# Patient Record
Sex: FEMALE | Race: WHITE | NOT HISPANIC OR LATINO | Employment: FULL TIME | URBAN - METROPOLITAN AREA
[De-identification: names, ages, dates, MRNs, and addresses within clinical notes are randomized per-mention and may not be internally consistent; named-entity substitution may affect disease eponyms.]

---

## 2017-08-01 ENCOUNTER — TRANSCRIBE ORDERS (OUTPATIENT)
Dept: URGENT CARE | Facility: MEDICAL CENTER | Age: 37
End: 2017-08-01

## 2017-08-01 ENCOUNTER — APPOINTMENT (OUTPATIENT)
Dept: URGENT CARE | Facility: MEDICAL CENTER | Age: 37
End: 2017-08-01

## 2017-08-01 ENCOUNTER — APPOINTMENT (OUTPATIENT)
Dept: LAB | Facility: MEDICAL CENTER | Age: 37
End: 2017-08-01

## 2017-08-01 DIAGNOSIS — Z02.1 PRE-EMPLOYMENT HEALTH SCREENING EXAMINATION: ICD-10-CM

## 2017-08-01 DIAGNOSIS — Z02.1 PRE-EMPLOYMENT HEALTH SCREENING EXAMINATION: Primary | ICD-10-CM

## 2017-08-01 LAB — RUBV IGG SERPL IA-ACNC: 86.2 IU/ML

## 2017-08-01 PROCEDURE — 86762 RUBELLA ANTIBODY: CPT

## 2017-08-01 PROCEDURE — 86765 RUBEOLA ANTIBODY: CPT

## 2017-08-01 PROCEDURE — 86787 VARICELLA-ZOSTER ANTIBODY: CPT

## 2017-08-01 PROCEDURE — 86735 MUMPS ANTIBODY: CPT

## 2017-08-01 PROCEDURE — 86480 TB TEST CELL IMMUN MEASURE: CPT

## 2017-08-01 PROCEDURE — 36415 COLL VENOUS BLD VENIPUNCTURE: CPT

## 2017-08-03 LAB
ANNOTATION COMMENT IMP: NORMAL
GAMMA INTERFERON BACKGROUND BLD IA-ACNC: 0.04 IU/ML
M TB IFN-G BLD-IMP: NEGATIVE
M TB IFN-G CD4+ BCKGRND COR BLD-ACNC: 0.01 IU/ML
M TB IFN-G CD4+ T-CELLS BLD-ACNC: 0.05 IU/ML
MEV IGG SER QL: NORMAL
MITOGEN IGNF BLD-ACNC: >10 IU/ML
MUV IGG SER QL: NORMAL
QUANTIFERON-TB GOLD IN TUBE: NORMAL
SERVICE CMNT-IMP: NORMAL
VZV IGG SER IA-ACNC: NORMAL

## 2017-09-07 ENCOUNTER — APPOINTMENT (OUTPATIENT)
Dept: LAB | Facility: HOSPITAL | Age: 37
End: 2017-09-07
Payer: COMMERCIAL

## 2017-09-07 ENCOUNTER — TRANSCRIBE ORDERS (OUTPATIENT)
Dept: LAB | Facility: HOSPITAL | Age: 37
End: 2017-09-07

## 2017-09-07 DIAGNOSIS — Z31.41 FERTILITY TESTING: Primary | ICD-10-CM

## 2017-09-07 DIAGNOSIS — Z31.41 FERTILITY TESTING: ICD-10-CM

## 2017-09-07 LAB — PROGEST SERPL-MCNC: 22.1 NG/ML

## 2017-09-07 PROCEDURE — 84144 ASSAY OF PROGESTERONE: CPT

## 2017-09-07 PROCEDURE — 36415 COLL VENOUS BLD VENIPUNCTURE: CPT

## 2017-10-11 ENCOUNTER — TRANSCRIBE ORDERS (OUTPATIENT)
Dept: ADMINISTRATIVE | Facility: HOSPITAL | Age: 37
End: 2017-10-11

## 2017-10-11 DIAGNOSIS — E07.89 OTHER SPECIFIED DISORDERS OF THYROID: Primary | ICD-10-CM

## 2017-10-18 ENCOUNTER — HOSPITAL ENCOUNTER (OUTPATIENT)
Dept: RADIOLOGY | Facility: HOSPITAL | Age: 37
Discharge: HOME | End: 2017-10-18
Payer: COMMERCIAL

## 2017-10-18 DIAGNOSIS — E07.89 OTHER SPECIFIED DISORDERS OF THYROID: ICD-10-CM

## 2017-10-18 PROCEDURE — 76536 US EXAM OF HEAD AND NECK: CPT

## 2017-11-29 ENCOUNTER — APPOINTMENT (OUTPATIENT)
Dept: LAB | Facility: HOSPITAL | Age: 37
End: 2017-11-29
Payer: COMMERCIAL

## 2017-11-29 ENCOUNTER — TRANSCRIBE ORDERS (OUTPATIENT)
Dept: LAB | Facility: HOSPITAL | Age: 37
End: 2017-11-29

## 2017-11-29 DIAGNOSIS — N97.9 PRIMARY FEMALE INFERTILITY: Primary | ICD-10-CM

## 2017-11-29 DIAGNOSIS — N97.9 PRIMARY FEMALE INFERTILITY: ICD-10-CM

## 2017-11-29 LAB — PROGEST SERPL-MCNC: 16.4 NG/ML

## 2017-11-29 PROCEDURE — 84144 ASSAY OF PROGESTERONE: CPT

## 2017-11-29 PROCEDURE — 36415 COLL VENOUS BLD VENIPUNCTURE: CPT

## 2017-12-09 ENCOUNTER — OFFICE VISIT (OUTPATIENT)
Dept: URGENT CARE | Facility: MEDICAL CENTER | Age: 37
End: 2017-12-09
Payer: COMMERCIAL

## 2017-12-09 PROCEDURE — 99202 OFFICE O/P NEW SF 15 MIN: CPT

## 2017-12-15 NOTE — PROGRESS NOTES
Assessment  1  External otitis of right ear (380 10) (H60 91)    Plan  External otitis of right ear    · Ciprodex 0 3-0 1 % Otic Suspension; INSTILL 3 DROPS IN AFFECTED EAR(S)TWICE DAILY  Unlinked    · PEG 3350/Electrolytes 240 GM Oral Solution Reconstituted    Discussion/Summary  Discussion Summary:   Follow-up if symptoms increase or no better in 5 days  Medication Side Effects Reviewed: Possible side effects of new medications were reviewed with the patient/guardian today  Understands and agrees with treatment plan: The treatment plan was reviewed with the patient/guardian  The patient/guardian understands and agrees with the treatment plan   Counseling Documentation With Imm: The patient was counseled regarding instructions for management,-- prognosis,-- patient and family education,-- impressions  Chief Complaint  1  Ear Pain  Chief Complaint Free Text Note Form: Pt with complaints of right outer pain for 3-4 days  History of Present Illness  HPI: Patient with right ear pain over the last 3-4 days  She did scratch in the ear  No drainage, fever or chills  Hospital Based Practices Required Assessment:  Pain Assessment  the patient states they have pain  The pain is located in the right outer ear  The patient describes the pain as aching  (on a scale of 0 to 10, the patient rates the pain at 4 )  Abuse And Domestic Violence Screen   Yes, the patient is safe at home  -- The patient states no one is hurting them  Depression And Suicide Screen  No, the patient has not had thoughts of hurting themself  No, the patient has not felt depressed in the past 7 days  Prefered Language is  english  Primary Language is  english  Review of Systems  ROS Reviewed:   ROS reviewed  Social History   · Denies alcohol consumption (V49 89) (Z78 9)   · Never a smoker    Current Meds   1  Dexilant 30 MG Oral Capsule Delayed Release; Therapy: (Recorded:[id number]) to Recorded   2   PEG 3350/Electrolytes 240 GM Oral Solution Reconstituted; Therapy: 18Apr2011 to (Last Rx:18Apr2011)  Requested for: [id number] Ordered    Allergies  1  Neomycin Sulfate TABS   2  Tramadol    Vitals  Signs   Recorded: [id number] 04:29PM   Temperature: 98 1 F  Heart Rate: 76  Respiration: 20  Systolic: 955  Diastolic: 70  O2 Saturation: 100  Pain Scale: 4    Physical Exam   Constitutional  General appearance: No acute distress, well appearing and well nourished  Ears, Nose, Mouth, and Throat  Otoscopic examination: Abnormal   The right external canal was tender,-- was swollen-- and-- was erythematous, but-- did not have a cerumen impaction,-- did not have desquamation of the epithelium,-- did not have a discharge,-- did not have a foreign body,-- did not have exostosis-- and-- did not have an acquired stenosis        Signatures   Electronically signed by : Elisa Spring, [facility]; Dec  9 2017  4:42PM EST                       (Author)    Electronically signed by : JOSE Hutchins ; Dec 14 2017 12:10PM EST                       (Co-author)

## 2018-01-23 VITALS
RESPIRATION RATE: 20 BRPM | SYSTOLIC BLOOD PRESSURE: 112 MMHG | HEART RATE: 76 BPM | DIASTOLIC BLOOD PRESSURE: 70 MMHG | TEMPERATURE: 98.1 F | OXYGEN SATURATION: 100 %

## 2018-03-20 ENCOUNTER — APPOINTMENT (OUTPATIENT)
Dept: LAB | Facility: HOSPITAL | Age: 38
End: 2018-03-20
Payer: COMMERCIAL

## 2018-03-20 ENCOUNTER — TRANSCRIBE ORDERS (OUTPATIENT)
Dept: ADMINISTRATIVE | Facility: HOSPITAL | Age: 38
End: 2018-03-20

## 2018-03-20 DIAGNOSIS — N91.2 ABSENCE OF MENSTRUATION: Primary | ICD-10-CM

## 2018-03-20 DIAGNOSIS — N91.2 ABSENCE OF MENSTRUATION: ICD-10-CM

## 2018-03-20 LAB
B-HCG SERPL-ACNC: <2 MIU/ML
ESTRADIOL SERPL-MCNC: 69 PG/ML
PROGEST SERPL-MCNC: 1.8 NG/ML

## 2018-03-20 PROCEDURE — 84144 ASSAY OF PROGESTERONE: CPT

## 2018-03-20 PROCEDURE — 82670 ASSAY OF TOTAL ESTRADIOL: CPT

## 2018-03-20 PROCEDURE — 84702 CHORIONIC GONADOTROPIN TEST: CPT

## 2018-03-20 PROCEDURE — 36415 COLL VENOUS BLD VENIPUNCTURE: CPT

## 2018-07-25 ENCOUNTER — TRANSCRIBE ORDERS (OUTPATIENT)
Dept: LAB | Facility: HOSPITAL | Age: 38
End: 2018-07-25

## 2018-07-25 ENCOUNTER — APPOINTMENT (OUTPATIENT)
Dept: LAB | Facility: HOSPITAL | Age: 38
End: 2018-07-25
Payer: COMMERCIAL

## 2018-07-25 DIAGNOSIS — R76.0 RAISED ANTIBODY TITER: ICD-10-CM

## 2018-07-25 DIAGNOSIS — D64.9 ANEMIA, UNSPECIFIED TYPE: ICD-10-CM

## 2018-07-25 DIAGNOSIS — Z01.83 ENCOUNTER FOR BLOOD TYPING: ICD-10-CM

## 2018-07-25 DIAGNOSIS — Z22.4 GONORRHEA CARRIER: ICD-10-CM

## 2018-07-25 DIAGNOSIS — E55.9 AVITAMINOSIS D: ICD-10-CM

## 2018-07-25 DIAGNOSIS — Z13.6 SCREENING FOR ISCHEMIC HEART DISEASE: ICD-10-CM

## 2018-07-25 DIAGNOSIS — Z11.8 SCREENING EXAMINATION FOR TRACHOMA: ICD-10-CM

## 2018-07-25 DIAGNOSIS — E66.8 OBESITY OF ENDOCRINE ORIGIN: ICD-10-CM

## 2018-07-25 DIAGNOSIS — E07.9 DISEASE OF THYROID GLAND: ICD-10-CM

## 2018-07-25 DIAGNOSIS — Z11.3 SCREENING EXAMINATION FOR VENEREAL DISEASE: ICD-10-CM

## 2018-07-25 DIAGNOSIS — R05.9 COUGH: ICD-10-CM

## 2018-07-25 DIAGNOSIS — Z00.8 HEALTH EXAMINATION IN POPULATION SURVEY: ICD-10-CM

## 2018-07-25 DIAGNOSIS — Z31.41 FERTILITY TESTING: ICD-10-CM

## 2018-07-25 DIAGNOSIS — Z00.8 HEALTH EXAMINATION IN POPULATION SURVEY: Primary | ICD-10-CM

## 2018-07-25 LAB
ALBUMIN SERPL BCP-MCNC: 3.7 G/DL (ref 3.5–5)
ALP SERPL-CCNC: 65 U/L (ref 46–116)
ALT SERPL W P-5'-P-CCNC: 21 U/L (ref 12–78)
AST SERPL W P-5'-P-CCNC: 12 U/L (ref 5–45)
BASOPHILS # BLD AUTO: 0.02 THOUSANDS/ΜL (ref 0–0.1)
BASOPHILS NFR BLD AUTO: 0 % (ref 0–1)
BILIRUB DIRECT SERPL-MCNC: 0.11 MG/DL (ref 0–0.2)
BILIRUB SERPL-MCNC: 0.47 MG/DL (ref 0.2–1)
CHOLEST SERPL-MCNC: 172 MG/DL (ref 50–200)
EOSINOPHIL # BLD AUTO: 0.44 THOUSAND/ΜL (ref 0–0.61)
EOSINOPHIL NFR BLD AUTO: 5 % (ref 0–6)
ERYTHROCYTE [DISTWIDTH] IN BLOOD BY AUTOMATED COUNT: 12.9 % (ref 11.6–15.1)
EST. AVERAGE GLUCOSE BLD GHB EST-MCNC: 105 MG/DL
HBA1C MFR BLD: 5.3 % (ref 4.2–6.3)
HCT VFR BLD AUTO: 37.5 % (ref 34.8–46.1)
HDLC SERPL-MCNC: 47 MG/DL (ref 40–60)
HGB BLD-MCNC: 12.1 G/DL (ref 11.5–15.4)
IMM GRANULOCYTES # BLD AUTO: 0.03 THOUSAND/UL (ref 0–0.2)
IMM GRANULOCYTES NFR BLD AUTO: 0 % (ref 0–2)
LDLC SERPL CALC-MCNC: 101 MG/DL (ref 0–100)
LYMPHOCYTES # BLD AUTO: 2.33 THOUSANDS/ΜL (ref 0.6–4.47)
LYMPHOCYTES NFR BLD AUTO: 28 % (ref 14–44)
MCH RBC QN AUTO: 28.9 PG (ref 26.8–34.3)
MCHC RBC AUTO-ENTMCNC: 32.3 G/DL (ref 31.4–37.4)
MCV RBC AUTO: 90 FL (ref 82–98)
MONOCYTES # BLD AUTO: 0.52 THOUSAND/ΜL (ref 0.17–1.22)
MONOCYTES NFR BLD AUTO: 6 % (ref 4–12)
NEUTROPHILS # BLD AUTO: 5.07 THOUSANDS/ΜL (ref 1.85–7.62)
NEUTS SEG NFR BLD AUTO: 61 % (ref 43–75)
NONHDLC SERPL-MCNC: 125 MG/DL
NRBC BLD AUTO-RTO: 0 /100 WBCS
PLATELET # BLD AUTO: 252 THOUSANDS/UL (ref 149–390)
PMV BLD AUTO: 10.4 FL (ref 8.9–12.7)
PROT SERPL-MCNC: 7.2 G/DL (ref 6.4–8.2)
RBC # BLD AUTO: 4.19 MILLION/UL (ref 3.81–5.12)
TRIGL SERPL-MCNC: 119 MG/DL
WBC # BLD AUTO: 8.41 THOUSAND/UL (ref 4.31–10.16)

## 2018-07-25 PROCEDURE — 87591 N.GONORRHOEAE DNA AMP PROB: CPT

## 2018-07-25 PROCEDURE — 80076 HEPATIC FUNCTION PANEL: CPT

## 2018-07-25 PROCEDURE — 36415 COLL VENOUS BLD VENIPUNCTURE: CPT

## 2018-07-25 PROCEDURE — 87491 CHLMYD TRACH DNA AMP PROBE: CPT

## 2018-07-25 PROCEDURE — 80061 LIPID PANEL: CPT

## 2018-07-25 PROCEDURE — 83036 HEMOGLOBIN GLYCOSYLATED A1C: CPT

## 2018-07-25 PROCEDURE — 85025 COMPLETE CBC W/AUTO DIFF WBC: CPT

## 2018-07-26 LAB
CHLAMYDIA DNA CVX QL NAA+PROBE: NORMAL
N GONORRHOEA DNA GENITAL QL NAA+PROBE: NORMAL

## 2018-09-04 ENCOUNTER — ANESTHESIA EVENT (OUTPATIENT)
Dept: PERIOP | Facility: AMBULARY SURGERY CENTER | Age: 38
End: 2018-09-04
Payer: COMMERCIAL

## 2018-09-04 RX ORDER — ALPRAZOLAM 0.5 MG/1
TABLET ORAL AS NEEDED
COMMUNITY
End: 2020-12-05 | Stop reason: ALTCHOICE

## 2018-09-04 RX ORDER — NORETHINDRONE ACETATE AND ETHINYL ESTRADIOL 1; 5 MG/1; UG/1
1 TABLET ORAL
COMMUNITY
End: 2020-12-05 | Stop reason: ALTCHOICE

## 2018-09-04 RX ORDER — ONDANSETRON 4 MG/1
4 TABLET, FILM COATED ORAL EVERY 8 HOURS PRN
COMMUNITY
End: 2021-06-16 | Stop reason: SDUPTHER

## 2018-09-04 RX ORDER — DEXLANSOPRAZOLE 60 MG/1
60 CAPSULE, DELAYED RELEASE ORAL
COMMUNITY
End: 2020-12-05 | Stop reason: ALTCHOICE

## 2018-09-04 RX ORDER — ZOLMITRIPTAN 2.5 MG/1
2.5 TABLET, FILM COATED ORAL ONCE AS NEEDED
COMMUNITY
End: 2020-12-05 | Stop reason: ALTCHOICE

## 2018-09-04 NOTE — PRE-PROCEDURE INSTRUCTIONS
Pre-Surgery Instructions:   Medication Instructions    ALPRAZolam (XANAX) 0 5 mg tablet Instructed patient per Anesthesia Guidelines   Coenzyme Q10 (COQ10 PO) Instructed patient per Anesthesia Guidelines   dexlansoprazole (DEXILANT) 60 MG capsule Instructed patient per Anesthesia Guidelines   norethindrone-ethinyl estradiol (FEMHRT 1/5) 1-5 MG-MCG TABS Patient was instructed by Physician and understands   ondansetron (ZOFRAN) 4 mg tablet Instructed patient per Anesthesia Guidelines   Prenatal Vit-Fe Fumarate-FA (PRENATAL FORMULA PO) Instructed patient per Anesthesia Guidelines   ZOLMitriptan (ZOMIG) 2 5 MG tablet Instructed patient per Anesthesia Guidelines      Pre op and showering instructions using an antibacterial soap reviewed

## 2018-09-10 ENCOUNTER — HOSPITAL ENCOUNTER (OUTPATIENT)
Facility: AMBULARY SURGERY CENTER | Age: 38
Setting detail: OUTPATIENT SURGERY
Discharge: HOME | End: 2018-09-10
Attending: OBSTETRICS & GYNECOLOGY | Admitting: OBSTETRICS & GYNECOLOGY
Payer: COMMERCIAL

## 2018-09-10 ENCOUNTER — ANESTHESIA (OUTPATIENT)
Dept: PERIOP | Facility: AMBULARY SURGERY CENTER | Age: 38
End: 2018-09-10
Payer: COMMERCIAL

## 2018-09-10 VITALS
TEMPERATURE: 97.5 F | SYSTOLIC BLOOD PRESSURE: 124 MMHG | RESPIRATION RATE: 20 BRPM | BODY MASS INDEX: 27.43 KG/M2 | OXYGEN SATURATION: 99 % | DIASTOLIC BLOOD PRESSURE: 59 MMHG | HEIGHT: 68 IN | WEIGHT: 181 LBS | HEART RATE: 71 BPM

## 2018-09-10 DIAGNOSIS — Z98.890 STATUS POST HYSTEROSCOPY: Primary | ICD-10-CM

## 2018-09-10 DIAGNOSIS — N85.6 INTRAUTERINE SYNECHIAE: ICD-10-CM

## 2018-09-10 LAB — EXT PREGNANCY TEST URINE: NEGATIVE

## 2018-09-10 PROCEDURE — 88305 TISSUE EXAM BY PATHOLOGIST: CPT | Performed by: PATHOLOGY

## 2018-09-10 PROCEDURE — 81025 URINE PREGNANCY TEST: CPT | Performed by: OBSTETRICS & GYNECOLOGY

## 2018-09-10 RX ORDER — PROPOFOL 10 MG/ML
INJECTION, EMULSION INTRAVENOUS CONTINUOUS PRN
Status: DISCONTINUED | OUTPATIENT
Start: 2018-09-10 | End: 2018-09-10 | Stop reason: SURG

## 2018-09-10 RX ORDER — FENTANYL CITRATE/PF 50 MCG/ML
50 SYRINGE (ML) INJECTION
Status: DISCONTINUED | OUTPATIENT
Start: 2018-09-10 | End: 2018-09-10 | Stop reason: HOSPADM

## 2018-09-10 RX ORDER — SODIUM CHLORIDE 9 MG/ML
INJECTION, SOLUTION INTRAVENOUS CONTINUOUS PRN
Status: DISCONTINUED | OUTPATIENT
Start: 2018-09-10 | End: 2018-09-10 | Stop reason: SURG

## 2018-09-10 RX ORDER — ONDANSETRON 2 MG/ML
4 INJECTION INTRAMUSCULAR; INTRAVENOUS ONCE AS NEEDED
Status: DISCONTINUED | OUTPATIENT
Start: 2018-09-10 | End: 2018-09-10 | Stop reason: HOSPADM

## 2018-09-10 RX ORDER — LIDOCAINE HYDROCHLORIDE 10 MG/ML
INJECTION, SOLUTION INFILTRATION; PERINEURAL AS NEEDED
Status: DISCONTINUED | OUTPATIENT
Start: 2018-09-10 | End: 2018-09-10

## 2018-09-10 RX ORDER — ACETAMINOPHEN 325 MG/1
650 TABLET ORAL EVERY 6 HOURS PRN
Qty: 30 TABLET | Refills: 0 | Status: CANCELLED
Start: 2018-09-10

## 2018-09-10 RX ORDER — IBUPROFEN 600 MG/1
600 TABLET ORAL EVERY 6 HOURS PRN
Status: DISCONTINUED | OUTPATIENT
Start: 2018-09-10 | End: 2018-09-10 | Stop reason: HOSPADM

## 2018-09-10 RX ORDER — LIDOCAINE HYDROCHLORIDE 10 MG/ML
INJECTION, SOLUTION INFILTRATION; PERINEURAL AS NEEDED
Status: DISCONTINUED | OUTPATIENT
Start: 2018-09-10 | End: 2018-09-10 | Stop reason: SURG

## 2018-09-10 RX ORDER — FENTANYL CITRATE 50 UG/ML
INJECTION, SOLUTION INTRAMUSCULAR; INTRAVENOUS AS NEEDED
Status: DISCONTINUED | OUTPATIENT
Start: 2018-09-10 | End: 2018-09-10 | Stop reason: SURG

## 2018-09-10 RX ORDER — METOCLOPRAMIDE HYDROCHLORIDE 5 MG/ML
10 INJECTION INTRAMUSCULAR; INTRAVENOUS ONCE AS NEEDED
Status: DISCONTINUED | OUTPATIENT
Start: 2018-09-10 | End: 2018-09-10 | Stop reason: HOSPADM

## 2018-09-10 RX ORDER — PROPOFOL 10 MG/ML
INJECTION, EMULSION INTRAVENOUS AS NEEDED
Status: DISCONTINUED | OUTPATIENT
Start: 2018-09-10 | End: 2018-09-10 | Stop reason: SURG

## 2018-09-10 RX ORDER — ONDANSETRON 2 MG/ML
INJECTION INTRAMUSCULAR; INTRAVENOUS AS NEEDED
Status: DISCONTINUED | OUTPATIENT
Start: 2018-09-10 | End: 2018-09-10 | Stop reason: SURG

## 2018-09-10 RX ORDER — MIDAZOLAM HYDROCHLORIDE 1 MG/ML
INJECTION INTRAMUSCULAR; INTRAVENOUS AS NEEDED
Status: DISCONTINUED | OUTPATIENT
Start: 2018-09-10 | End: 2018-09-10 | Stop reason: SURG

## 2018-09-10 RX ORDER — ACETAMINOPHEN 325 MG/1
650 TABLET ORAL EVERY 6 HOURS PRN
Status: DISCONTINUED | OUTPATIENT
Start: 2018-09-10 | End: 2018-09-10 | Stop reason: HOSPADM

## 2018-09-10 RX ORDER — MAGNESIUM HYDROXIDE 1200 MG/15ML
LIQUID ORAL AS NEEDED
Status: DISCONTINUED | OUTPATIENT
Start: 2018-09-10 | End: 2018-09-10 | Stop reason: HOSPADM

## 2018-09-10 RX ORDER — IBUPROFEN 600 MG/1
600 TABLET ORAL EVERY 6 HOURS PRN
Qty: 30 TABLET | Refills: 0 | Status: CANCELLED
Start: 2018-09-10

## 2018-09-10 RX ORDER — DIPHENOXYLATE HYDROCHLORIDE AND ATROPINE SULFATE 2.5; .025 MG/1; MG/1
1 TABLET ORAL DAILY
COMMUNITY

## 2018-09-10 RX ADMIN — FENTANYL CITRATE 50 MCG: 50 INJECTION INTRAMUSCULAR; INTRAVENOUS at 09:03

## 2018-09-10 RX ADMIN — FENTANYL CITRATE 50 MCG: 50 INJECTION INTRAMUSCULAR; INTRAVENOUS at 09:09

## 2018-09-10 RX ADMIN — ONDANSETRON 4 MG: 2 INJECTION INTRAMUSCULAR; INTRAVENOUS at 08:15

## 2018-09-10 RX ADMIN — FENTANYL CITRATE 50 MCG: 50 INJECTION, SOLUTION INTRAMUSCULAR; INTRAVENOUS at 08:37

## 2018-09-10 RX ADMIN — MIDAZOLAM HYDROCHLORIDE 2 MG: 1 INJECTION, SOLUTION INTRAMUSCULAR; INTRAVENOUS at 08:00

## 2018-09-10 RX ADMIN — DEXAMETHASONE SODIUM PHOSPHATE 10 MG: 10 INJECTION INTRAMUSCULAR; INTRAVENOUS at 08:15

## 2018-09-10 RX ADMIN — PROPOFOL 200 MG: 10 INJECTION, EMULSION INTRAVENOUS at 08:03

## 2018-09-10 RX ADMIN — PROPOFOL 140 MCG/KG/MIN: 10 INJECTION, EMULSION INTRAVENOUS at 08:03

## 2018-09-10 RX ADMIN — FENTANYL CITRATE 50 MCG: 50 INJECTION, SOLUTION INTRAMUSCULAR; INTRAVENOUS at 08:21

## 2018-09-10 RX ADMIN — SODIUM CHLORIDE: 0.9 INJECTION, SOLUTION INTRAVENOUS at 07:08

## 2018-09-10 RX ADMIN — FENTANYL CITRATE 50 MCG: 50 INJECTION, SOLUTION INTRAMUSCULAR; INTRAVENOUS at 08:03

## 2018-09-10 RX ADMIN — FENTANYL CITRATE 50 MCG: 50 INJECTION, SOLUTION INTRAMUSCULAR; INTRAVENOUS at 08:17

## 2018-09-10 RX ADMIN — LIDOCAINE HYDROCHLORIDE 40 MG: 10 INJECTION, SOLUTION INFILTRATION; PERINEURAL at 08:03

## 2018-09-10 NOTE — ANESTHESIA PREPROCEDURE EVALUATION
Review of Systems/Medical History  Patient summary reviewed  Chart reviewed  History of anesthetic complications PONV    Cardiovascular   Pulmonary  Negative pulmonary ROS        GI/Hepatic    GERD ,             Endo/Other     GYN       Hematology   Musculoskeletal       Neurology    Headaches,    Psychology   Anxiety,              Physical Exam    Airway    Mallampati score: II  TM Distance: >3 FB  Neck ROM: full     Dental       Cardiovascular      Pulmonary      Other Findings        Anesthesia Plan  ASA Score- 2     Anesthesia Type- general with ASA Monitors  Additional Monitors:   Airway Plan: LMA  Plan Factors-    Induction- intravenous  Postoperative Plan-     Informed Consent- Anesthetic plan and risks discussed with patient  I personally reviewed this patient with the CRNA  Discussed and agreed on the Anesthesia Plan with the CRNA  Lawrence Koo

## 2018-09-10 NOTE — ANESTHESIA POSTPROCEDURE EVALUATION
Post-Op Assessment Note      CV Status:  Stable    Mental Status:  Somnolent and lethargic    Hydration Status:  Euvolemic    PONV Controlled:  Controlled    Airway Patency:  Patent    Post Op Vitals Reviewed: Yes          Staff: CRNA           BP   124/67   Temp      Pulse 74   Resp 16   SpO2 100

## 2018-09-10 NOTE — DISCHARGE INSTRUCTIONS
Hysteroscopy   WHAT YOU SHOULD KNOW:   A hysteroscopy is a procedure to look at the inside of your uterus  Other procedures may also be done during the hysteroscopy  AFTER YOU LEAVE:   Medicines:   · Acetaminophen  decreases pain  It is available without a doctor's order  Ask how much to take and how often to take it  Follow directions  Acetaminophen can cause liver damage if not taken correctly  · NSAIDs  help decrease swelling and pain  This medicine is available with or without a doctor's order  NSAIDs can cause stomach bleeding or kidney problems in certain people  If you take blood thinner medicine, always ask your primary healthcare provider (PHP) if NSAIDs are safe for you  Always read the medicine label and follow directions  · Take your medicine as directed  Contact your PHP if you think your medicine is not helping or if you have side effects  Tell him if you are allergic to any medicine  Keep a list of the medicines, vitamins, and herbs you take  Include the amounts, and when and why you take them  Bring the list or the pill bottles to follow-up visits  Carry your medicine list with you in case of an emergency  Follow up with your PHP or gynecologist as directed:  Write down your questions so you remember to ask them during your visits  Activity guidelines: You may feel like resting more after the procedure  Slowly start to do more each day  Rest when you feel it is needed  Ask your PHP when you can return to your daily activities  Self-care:   · Do not put anything into your vagina until your PHP says it is okay  Do not have sex, douche, or use tampons  · You may need to continue to wear a sanitary pad for up to a week  You may have light bleeding or spotting  Contact your PHP if:   · You have a fever  · You have to change your sanitary pad every hour  · You have chills, a cough, or feel weak and achy  · You have abdominal pain that does not go away      · You have abnormal or foul-smelling vaginal discharge  · Your skin is itchy, swollen, or you have a rash  · You have questions or concerns about your condition or care  © 2014 [address] is for End User's use only and may not be sold, redistributed or otherwise used for commercial purposes  All illustrations and images included in CareNotes® are the copyrighted property of A D A M , Inc  or Paul Croft  The above information is an  only  It is not intended as medical advice for individual conditions or treatments  Talk to your doctor, nurse or pharmacist before following any medical regimen to see if it is safe and effective for you

## 2018-09-10 NOTE — OP NOTE
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]    Procedure(s) (LRB):  HYSTEROSCOPY; LYSIS OF ADHESIONS; ENDOMETRIAL BIOPSY; MYOSURE (N/A)    Specimen(s):  ID Type Source Tests Collected by Time Destination   1 : endometrial currettings Tissue Endometrium TISSUE EXAM Ting Arechiga DO 9/10/2018 9488        Estimated Blood Loss:   Minimal    Drains: None  Urine: 50 ml  IVF: 250 ml  Hysteroscopic fluid deficit: 500 ml       Anesthesia Type:   Choice    Operative Indications:  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]      Operative Findings:  Uterus sounded to 7 centimeters anteverted  Through the hysteroscope both ostia were well visualized  There was some intrauterine scarring in the fundal region as well as the left cornual region and left sidewall  There was no other intracavitary lesions  Complications:   None    Procedure and Technique:  The patient was identified in the preoperative holding area and taken to the operative suite where she was put placed in supine position  She then underwent anesthesia  She was then placed in the dorsal lithotomy position and prepped and draped in the normal sterile fashion  A time-out was held according to protocol and was deemed we could begin the procedure  The bladder was emptied using a straight catheter  The cervix was visualized using a Markham retractor posteriorly and a Nunn retractor anteriorly  The anterior lip the cervix was grasped with a single-tooth tenaculum  The uterus sounded to 7 centimeters anteverted using an endometrial biopsy [place]  The cervix was progressively dilated to a 21 Western Shyla using Paul Lang dilators     The hysteroscope was then inserted and a careful survey of the cavity revealed the findings as noted above  Using the hysteroscopic scissors the adhesions in the left cornual region were excised  The adhesions in the fundal region were also excised  The scarring on the left uterine corpus was difficult to access therefore the MyoSure hysteroscope device was used to shave off these adhesions  A biopsy of the lining was performed and tissue sent to pathology for further evaluation  A careful survey of the cavity at the conclusion of the procedure did reveal that normal anatomy had been restored  There was normal healthy tissue underneath the scar tissue  All instruments were then removed from the patient's vagina and hemostasis was observed  The patient was returned to supine position and awakened from anesthesia and taken to the recovery room were she was noted to be in stable condition  It should be noted at the end of the procedure all sponge lap needle instrument counts were correct x2 per the RN  I was present for the entire procedure and I performed the procedure  A qualified resident was not available      Patient Disposition:  PACU     SIGNATURE: Alexi Pablo DO  DATE: September 10, 2018  TIME: 8:47 AM

## 2018-09-28 ENCOUNTER — APPOINTMENT (OUTPATIENT)
Dept: LAB | Facility: HOSPITAL | Age: 38
End: 2018-09-28
Attending: OBSTETRICS & GYNECOLOGY
Payer: COMMERCIAL

## 2018-09-28 ENCOUNTER — TRANSCRIBE ORDERS (OUTPATIENT)
Dept: LAB | Facility: HOSPITAL | Age: 38
End: 2018-09-28

## 2018-09-28 DIAGNOSIS — E28.0 HYPERESTROGENISM: ICD-10-CM

## 2018-09-28 DIAGNOSIS — N97.9 PRIMARY FEMALE INFERTILITY: ICD-10-CM

## 2018-09-28 DIAGNOSIS — N97.9 PRIMARY FEMALE INFERTILITY: Primary | ICD-10-CM

## 2018-09-28 LAB
ESTRADIOL SERPL-MCNC: 311 PG/ML
PROGEST SERPL-MCNC: 20.8 NG/ML

## 2018-09-28 PROCEDURE — 36415 COLL VENOUS BLD VENIPUNCTURE: CPT

## 2018-09-28 PROCEDURE — 82670 ASSAY OF TOTAL ESTRADIOL: CPT

## 2018-09-28 PROCEDURE — 84144 ASSAY OF PROGESTERONE: CPT

## 2018-11-27 ENCOUNTER — APPOINTMENT (OUTPATIENT)
Dept: LAB | Facility: CLINIC | Age: 38
End: 2018-11-27
Payer: COMMERCIAL

## 2018-11-27 DIAGNOSIS — N97.9 PRIMARY FEMALE INFERTILITY: Primary | ICD-10-CM

## 2018-11-27 DIAGNOSIS — E28.0 HYPERESTROGENISM: ICD-10-CM

## 2018-11-27 LAB
ESTRADIOL SERPL-MCNC: 315 PG/ML
PROGEST SERPL-MCNC: 23.5 NG/ML

## 2018-11-27 PROCEDURE — 84144 ASSAY OF PROGESTERONE: CPT

## 2018-11-27 PROCEDURE — 82670 ASSAY OF TOTAL ESTRADIOL: CPT

## 2018-11-27 PROCEDURE — 36415 COLL VENOUS BLD VENIPUNCTURE: CPT

## 2018-12-21 ENCOUNTER — APPOINTMENT (OUTPATIENT)
Dept: LAB | Facility: MEDICAL CENTER | Age: 38
End: 2018-12-21
Payer: COMMERCIAL

## 2018-12-21 ENCOUNTER — TRANSCRIBE ORDERS (OUTPATIENT)
Dept: ADMINISTRATIVE | Facility: HOSPITAL | Age: 38
End: 2018-12-21

## 2018-12-21 DIAGNOSIS — N97.9 PRIMARY FEMALE INFERTILITY: Primary | ICD-10-CM

## 2018-12-21 DIAGNOSIS — N97.9 PRIMARY FEMALE INFERTILITY: ICD-10-CM

## 2018-12-21 DIAGNOSIS — E28.0 HYPERESTROGENISM: ICD-10-CM

## 2018-12-21 LAB
ESTRADIOL SERPL-MCNC: 354 PG/ML
PROGEST SERPL-MCNC: 38 NG/ML

## 2018-12-21 PROCEDURE — 82670 ASSAY OF TOTAL ESTRADIOL: CPT

## 2018-12-21 PROCEDURE — 36415 COLL VENOUS BLD VENIPUNCTURE: CPT

## 2018-12-21 PROCEDURE — 84144 ASSAY OF PROGESTERONE: CPT

## 2018-12-28 ENCOUNTER — TRANSCRIBE ORDERS (OUTPATIENT)
Dept: ADMINISTRATIVE | Facility: HOSPITAL | Age: 38
End: 2018-12-28

## 2018-12-28 ENCOUNTER — APPOINTMENT (OUTPATIENT)
Dept: LAB | Facility: HOSPITAL | Age: 38
End: 2018-12-28
Payer: COMMERCIAL

## 2018-12-28 DIAGNOSIS — E28.0 HYPERESTROGENISM: ICD-10-CM

## 2018-12-28 DIAGNOSIS — N97.9 PRIMARY FEMALE INFERTILITY: ICD-10-CM

## 2018-12-28 DIAGNOSIS — Z32.00 PREGNANCY EXAMINATION OR TEST, PREGNANCY UNCONFIRMED: Primary | ICD-10-CM

## 2018-12-28 DIAGNOSIS — Z32.00 PREGNANCY EXAMINATION OR TEST, PREGNANCY UNCONFIRMED: ICD-10-CM

## 2018-12-28 DIAGNOSIS — E28.0 HYPERESTROGENISM: Primary | ICD-10-CM

## 2018-12-28 LAB
B-HCG SERPL-ACNC: <2 MIU/ML
ESTRADIOL SERPL-MCNC: 22 PG/ML
PROGEST SERPL-MCNC: 12.9 NG/ML

## 2018-12-28 PROCEDURE — 84702 CHORIONIC GONADOTROPIN TEST: CPT

## 2018-12-28 PROCEDURE — 84144 ASSAY OF PROGESTERONE: CPT

## 2018-12-28 PROCEDURE — 82670 ASSAY OF TOTAL ESTRADIOL: CPT

## 2018-12-28 PROCEDURE — 36415 COLL VENOUS BLD VENIPUNCTURE: CPT

## 2019-01-04 DIAGNOSIS — G43.709 CHRONIC MIGRAINE WITHOUT AURA WITHOUT STATUS MIGRAINOSUS, NOT INTRACTABLE: Primary | ICD-10-CM

## 2019-01-04 RX ORDER — ZOLMITRIPTAN 5 MG/1
TABLET, FILM COATED ORAL
Qty: 12 TABLET | Refills: 3 | Status: SHIPPED | OUTPATIENT
Start: 2019-01-04 | End: 2020-11-06 | Stop reason: SDUPTHER

## 2019-01-21 ENCOUNTER — APPOINTMENT (OUTPATIENT)
Dept: LAB | Facility: MEDICAL CENTER | Age: 39
End: 2019-01-21
Payer: COMMERCIAL

## 2019-01-21 DIAGNOSIS — N97.9 PRIMARY FEMALE INFERTILITY: ICD-10-CM

## 2019-01-21 DIAGNOSIS — E28.0 HYPERESTROGENISM: ICD-10-CM

## 2019-01-21 LAB
ESTRADIOL SERPL-MCNC: 186 PG/ML
PROGEST SERPL-MCNC: 16.4 NG/ML

## 2019-01-21 PROCEDURE — 82670 ASSAY OF TOTAL ESTRADIOL: CPT

## 2019-01-21 PROCEDURE — 36415 COLL VENOUS BLD VENIPUNCTURE: CPT

## 2019-01-21 PROCEDURE — 84144 ASSAY OF PROGESTERONE: CPT

## 2019-01-28 ENCOUNTER — APPOINTMENT (OUTPATIENT)
Dept: LAB | Age: 39
End: 2019-01-28
Payer: COMMERCIAL

## 2019-01-28 ENCOUNTER — TRANSCRIBE ORDERS (OUTPATIENT)
Dept: ADMINISTRATIVE | Facility: HOSPITAL | Age: 39
End: 2019-01-28

## 2019-01-28 DIAGNOSIS — E28.0 HYPERESTROGENISM: ICD-10-CM

## 2019-01-28 DIAGNOSIS — N97.9 PRIMARY FEMALE INFERTILITY: ICD-10-CM

## 2019-01-28 DIAGNOSIS — Z32.00 PREGNANCY EXAMINATION OR TEST, PREGNANCY UNCONFIRMED: Primary | ICD-10-CM

## 2019-01-28 DIAGNOSIS — Z32.00 PREGNANCY EXAMINATION OR TEST, PREGNANCY UNCONFIRMED: ICD-10-CM

## 2019-01-28 LAB
B-HCG SERPL-ACNC: 116 MIU/ML
ESTRADIOL SERPL-MCNC: 273 PG/ML
PROGEST SERPL-MCNC: 25.1 NG/ML

## 2019-01-28 PROCEDURE — 36415 COLL VENOUS BLD VENIPUNCTURE: CPT

## 2019-01-28 PROCEDURE — 82670 ASSAY OF TOTAL ESTRADIOL: CPT

## 2019-01-28 PROCEDURE — 84144 ASSAY OF PROGESTERONE: CPT

## 2019-01-28 PROCEDURE — 84702 CHORIONIC GONADOTROPIN TEST: CPT

## 2019-01-30 ENCOUNTER — APPOINTMENT (OUTPATIENT)
Dept: LAB | Facility: HOSPITAL | Age: 39
End: 2019-01-30
Attending: OBSTETRICS & GYNECOLOGY
Payer: COMMERCIAL

## 2019-01-30 DIAGNOSIS — N97.9 PRIMARY FEMALE INFERTILITY: ICD-10-CM

## 2019-01-30 DIAGNOSIS — Z32.00 PREGNANCY EXAMINATION OR TEST, PREGNANCY UNCONFIRMED: ICD-10-CM

## 2019-01-30 DIAGNOSIS — E28.0 HYPERESTROGENISM: ICD-10-CM

## 2019-01-30 LAB
B-HCG SERPL-ACNC: 260.9 MIU/ML
ESTRADIOL SERPL-MCNC: 305 PG/ML
PROGEST SERPL-MCNC: 31.9 NG/ML

## 2019-01-30 PROCEDURE — 84702 CHORIONIC GONADOTROPIN TEST: CPT

## 2019-01-30 PROCEDURE — 36415 COLL VENOUS BLD VENIPUNCTURE: CPT

## 2019-01-30 PROCEDURE — 84144 ASSAY OF PROGESTERONE: CPT

## 2019-01-30 PROCEDURE — 82670 ASSAY OF TOTAL ESTRADIOL: CPT

## 2019-02-01 ENCOUNTER — APPOINTMENT (OUTPATIENT)
Dept: LAB | Facility: MEDICAL CENTER | Age: 39
End: 2019-02-01
Payer: COMMERCIAL

## 2019-02-01 ENCOUNTER — TRANSCRIBE ORDERS (OUTPATIENT)
Dept: ADMINISTRATIVE | Facility: HOSPITAL | Age: 39
End: 2019-02-01

## 2019-02-01 DIAGNOSIS — Z32.00 PREGNANCY EXAMINATION OR TEST, PREGNANCY UNCONFIRMED: ICD-10-CM

## 2019-02-01 DIAGNOSIS — E28.0 HYPERESTROGENISM: ICD-10-CM

## 2019-02-01 DIAGNOSIS — N97.9 PRIMARY FEMALE INFERTILITY: ICD-10-CM

## 2019-02-01 DIAGNOSIS — N97.9 PRIMARY FEMALE INFERTILITY: Primary | ICD-10-CM

## 2019-02-01 LAB
B-HCG SERPL-ACNC: 511 MIU/ML
ESTRADIOL SERPL-MCNC: 494 PG/ML
PROGEST SERPL-MCNC: 36.7 NG/ML

## 2019-02-01 PROCEDURE — 82670 ASSAY OF TOTAL ESTRADIOL: CPT

## 2019-02-01 PROCEDURE — 36415 COLL VENOUS BLD VENIPUNCTURE: CPT

## 2019-02-01 PROCEDURE — 84144 ASSAY OF PROGESTERONE: CPT

## 2019-02-01 PROCEDURE — 84702 CHORIONIC GONADOTROPIN TEST: CPT

## 2019-02-04 ENCOUNTER — APPOINTMENT (OUTPATIENT)
Dept: LAB | Facility: HOSPITAL | Age: 39
End: 2019-02-04
Payer: COMMERCIAL

## 2019-02-04 ENCOUNTER — TRANSCRIBE ORDERS (OUTPATIENT)
Dept: ADMINISTRATIVE | Facility: HOSPITAL | Age: 39
End: 2019-02-04

## 2019-02-04 DIAGNOSIS — N97.9 PRIMARY FEMALE INFERTILITY: ICD-10-CM

## 2019-02-04 DIAGNOSIS — Z32.00 PREGNANCY EXAMINATION OR TEST, PREGNANCY UNCONFIRMED: Primary | ICD-10-CM

## 2019-02-04 DIAGNOSIS — Z32.00 PREGNANCY EXAMINATION OR TEST, PREGNANCY UNCONFIRMED: ICD-10-CM

## 2019-02-04 LAB
B-HCG SERPL-ACNC: 1653 MIU/ML
ESTRADIOL SERPL-MCNC: 512 PG/ML
PROGEST SERPL-MCNC: 28.3 NG/ML

## 2019-02-04 PROCEDURE — 36415 COLL VENOUS BLD VENIPUNCTURE: CPT

## 2019-02-04 PROCEDURE — 82670 ASSAY OF TOTAL ESTRADIOL: CPT

## 2019-02-04 PROCEDURE — 84144 ASSAY OF PROGESTERONE: CPT

## 2019-02-04 PROCEDURE — 84702 CHORIONIC GONADOTROPIN TEST: CPT

## 2019-04-04 DIAGNOSIS — G43.709 CHRONIC MIGRAINE WITHOUT AURA WITHOUT STATUS MIGRAINOSUS, NOT INTRACTABLE: Primary | ICD-10-CM

## 2019-04-04 RX ORDER — PREDNISONE 10 MG/1
10 TABLET ORAL DAILY
Qty: 10 TABLET | Refills: 0 | Status: SHIPPED | OUTPATIENT
Start: 2019-04-04 | End: 2020-12-05 | Stop reason: ALTCHOICE

## 2019-04-04 RX ORDER — BUTALBITAL, ACETAMINOPHEN AND CAFFEINE 50; 325; 40 MG/1; MG/1; MG/1
1 TABLET ORAL EVERY 4 HOURS PRN
Qty: 5 TABLET | Refills: 0 | Status: SHIPPED | OUTPATIENT
Start: 2019-04-04 | End: 2020-12-05 | Stop reason: ALTCHOICE

## 2019-04-04 RX ORDER — CYCLOBENZAPRINE HCL 10 MG
10 TABLET ORAL
Qty: 30 TABLET | Refills: 0 | Status: SHIPPED | OUTPATIENT
Start: 2019-04-04 | End: 2020-12-05 | Stop reason: ALTCHOICE

## 2020-11-06 DIAGNOSIS — G43.709 CHRONIC MIGRAINE WITHOUT AURA WITHOUT STATUS MIGRAINOSUS, NOT INTRACTABLE: ICD-10-CM

## 2020-11-06 RX ORDER — ZOLMITRIPTAN 5 MG/1
TABLET, FILM COATED ORAL
Qty: 12 TABLET | Refills: 3 | Status: SHIPPED | OUTPATIENT
Start: 2020-11-06 | End: 2021-03-11 | Stop reason: SDUPTHER

## 2020-12-05 ENCOUNTER — OFFICE VISIT (OUTPATIENT)
Dept: URGENT CARE | Facility: MEDICAL CENTER | Age: 40
End: 2020-12-05
Payer: COMMERCIAL

## 2020-12-05 VITALS
TEMPERATURE: 96.9 F | HEIGHT: 67 IN | WEIGHT: 180 LBS | OXYGEN SATURATION: 97 % | RESPIRATION RATE: 20 BRPM | BODY MASS INDEX: 28.25 KG/M2 | HEART RATE: 109 BPM

## 2020-12-05 DIAGNOSIS — J02.9 SORE THROAT: Primary | ICD-10-CM

## 2020-12-05 DIAGNOSIS — Z11.59 SPECIAL SCREENING EXAMINATION FOR UNSPECIFIED VIRAL DISEASE: ICD-10-CM

## 2020-12-05 LAB — S PYO AG THROAT QL: NEGATIVE

## 2020-12-05 PROCEDURE — U0003 INFECTIOUS AGENT DETECTION BY NUCLEIC ACID (DNA OR RNA); SEVERE ACUTE RESPIRATORY SYNDROME CORONAVIRUS 2 (SARS-COV-2) (CORONAVIRUS DISEASE [COVID-19]), AMPLIFIED PROBE TECHNIQUE, MAKING USE OF HIGH THROUGHPUT TECHNOLOGIES AS DESCRIBED BY CMS-2020-01-R: HCPCS | Performed by: PHYSICIAN ASSISTANT

## 2020-12-05 PROCEDURE — 87070 CULTURE OTHR SPECIMN AEROBIC: CPT | Performed by: PHYSICIAN ASSISTANT

## 2020-12-05 PROCEDURE — S9083 URGENT CARE CENTER GLOBAL: HCPCS | Performed by: PHYSICIAN ASSISTANT

## 2020-12-05 PROCEDURE — G0382 LEV 3 HOSP TYPE B ED VISIT: HCPCS | Performed by: PHYSICIAN ASSISTANT

## 2020-12-05 PROCEDURE — 87880 STREP A ASSAY W/OPTIC: CPT | Performed by: PHYSICIAN ASSISTANT

## 2020-12-05 RX ORDER — OMEPRAZOLE 40 MG/1
CAPSULE, DELAYED RELEASE ORAL
COMMUNITY
Start: 2020-09-11

## 2020-12-07 LAB
BACTERIA THROAT CULT: NORMAL
SARS-COV-2 RNA SPEC QL NAA+PROBE: NOT DETECTED

## 2020-12-19 ENCOUNTER — IMMUNIZATIONS (OUTPATIENT)
Dept: FAMILY MEDICINE CLINIC | Facility: HOSPITAL | Age: 40
End: 2020-12-19
Payer: COMMERCIAL

## 2020-12-19 DIAGNOSIS — Z23 ENCOUNTER FOR IMMUNIZATION: ICD-10-CM

## 2020-12-19 PROCEDURE — 91300 SARS-COV-2 / COVID-19 MRNA VACCINE (PFIZER-BIONTECH) 30 MCG: CPT

## 2020-12-19 PROCEDURE — 0001A SARS-COV-2 / COVID-19 MRNA VACCINE (PFIZER-BIONTECH) 30 MCG: CPT

## 2021-01-08 ENCOUNTER — IMMUNIZATIONS (OUTPATIENT)
Dept: FAMILY MEDICINE CLINIC | Facility: HOSPITAL | Age: 41
End: 2021-01-08

## 2021-01-08 DIAGNOSIS — Z23 ENCOUNTER FOR IMMUNIZATION: ICD-10-CM

## 2021-01-08 PROCEDURE — 0002A SARS-COV-2 / COVID-19 MRNA VACCINE (PFIZER-BIONTECH) 30 MCG: CPT

## 2021-01-08 PROCEDURE — 91300 SARS-COV-2 / COVID-19 MRNA VACCINE (PFIZER-BIONTECH) 30 MCG: CPT

## 2021-03-11 ENCOUNTER — CONSULT (OUTPATIENT)
Dept: NEUROLOGY | Facility: CLINIC | Age: 41
End: 2021-03-11
Payer: COMMERCIAL

## 2021-03-11 ENCOUNTER — TELEPHONE (OUTPATIENT)
Dept: NEUROLOGY | Facility: CLINIC | Age: 41
End: 2021-03-11

## 2021-03-11 VITALS — SYSTOLIC BLOOD PRESSURE: 143 MMHG | HEART RATE: 87 BPM | DIASTOLIC BLOOD PRESSURE: 82 MMHG

## 2021-03-11 DIAGNOSIS — G43.709 CHRONIC MIGRAINE WITHOUT AURA WITHOUT STATUS MIGRAINOSUS, NOT INTRACTABLE: ICD-10-CM

## 2021-03-11 DIAGNOSIS — G43.009 MIGRAINE WITHOUT AURA AND WITHOUT STATUS MIGRAINOSUS, NOT INTRACTABLE: Primary | ICD-10-CM

## 2021-03-11 PROBLEM — H52.10 MYOPIA: Status: ACTIVE | Noted: 2020-03-20

## 2021-03-11 PROBLEM — E55.9 VITAMIN D DEFICIENCY: Status: ACTIVE | Noted: 2021-03-11

## 2021-03-11 PROCEDURE — 99245 OFF/OP CONSLTJ NEW/EST HI 55: CPT | Performed by: PSYCHIATRY & NEUROLOGY

## 2021-03-11 PROCEDURE — 1036F TOBACCO NON-USER: CPT | Performed by: PSYCHIATRY & NEUROLOGY

## 2021-03-11 RX ORDER — ACETAZOLAMIDE 125 MG/1
125 TABLET ORAL AS NEEDED
COMMUNITY

## 2021-03-11 RX ORDER — ZOLMITRIPTAN 5 MG/1
TABLET, FILM COATED ORAL
Qty: 12 TABLET | Refills: 3 | Status: SHIPPED | OUTPATIENT
Start: 2021-03-11 | End: 2022-03-30

## 2021-03-11 RX ORDER — METOCLOPRAMIDE 10 MG/1
TABLET ORAL
Qty: 20 TABLET | Refills: 3 | Status: SHIPPED | OUTPATIENT
Start: 2021-03-11

## 2021-03-11 RX ORDER — IBUPROFEN 200 MG
200 TABLET ORAL EVERY 6 HOURS PRN
COMMUNITY

## 2021-03-11 NOTE — TELEPHONE ENCOUNTER
Received notice from Dr Sabrina Rosado that [facility] will require a PA  Called [employer], confirmed med coverage through [employer]  Working on paper PA

## 2021-03-11 NOTE — PROGRESS NOTES
Review of Systems   Constitutional: Negative  Negative for appetite change and fever  HENT: Negative  Negative for hearing loss, tinnitus, trouble swallowing and voice change  Eyes: Negative  Negative for photophobia and pain  Respiratory: Negative  Negative for shortness of breath  Cardiovascular: Negative  Negative for palpitations  Gastrointestinal: Positive for nausea (Intermittent)  Negative for vomiting  Endocrine: Negative  Negative for cold intolerance  Genitourinary: Negative  Negative for dysuria, frequency and urgency  Musculoskeletal: Positive for back pain (Intermittent)  Negative for myalgias and neck pain  Skin: Negative  Negative for rash  Allergic/Immunologic: Negative  Neurological: Positive for headaches  Negative for dizziness, tremors, seizures, syncope, facial asymmetry, speech difficulty, weakness, light-headedness and numbness  Hematological: Negative  Does not bruise/bleed easily  Psychiatric/Behavioral: Negative  Negative for confusion, hallucinations and sleep disturbance  All other systems reviewed and are negative

## 2021-03-11 NOTE — PATIENT INSTRUCTIONS
Migraine headaches:  Katy Grier presents for an initial consultation with regard to a prior history of migraine headaches  Over the course of last 3 months her migraines have significantly changed in terms of frequency, severity, and particularly duration  In addition she has noticed some increased issues with regard to word-finding and decreased responsiveness of her migraine medications  Thankfully her neurologic exam is excellent in the office today     -as a preventative medication we will begin with Emgality  240 mg subcutaneous x1 and then 120 mg subcutaneous x1 every 30 days thereafter     - to treat a migraine when it occurs she can use Zomig 5 mg which can be repeated once in 2 hours  With the initial dose of Zomig she can take either ibuprofen 600 mg, Reglan 10 mg, or both  - she should work to remain well hydrated and should work up to consistently receiving 6 hours of sleep or more per night  - she has been keeping track of her migraines using her smart phone which is excellent I would encourage her to continue that practice   - we will request an MRI of the brain to evaluate and ensure that we are not dealing with any secondary cause for headaches considering the multiple changes that she has experienced in the last few months  I will plan to see her back in 10 weeks time but would be happy to see her sooner if the need should arise

## 2021-03-11 NOTE — PROGRESS NOTES
Patient Instructions   Migraine headaches:  Marcello Urias presents for an initial consultation with regard to a prior history of migraine headaches  Over the course of last 3 months her migraines have significantly changed in terms of frequency, severity, and particularly duration  In addition she has noticed some increased issues with regard to word-finding and decreased responsiveness of her migraine medications  Thankfully her neurologic exam is excellent in the office today     -as a preventative medication we will begin with Emgality  240 mg subcutaneous x1 and then 120 mg subcutaneous x1 every 30 days thereafter     - to treat a migraine when it occurs she can use Zomig 5 mg which can be repeated once in 2 hours  With the initial dose of Zomig she can take either ibuprofen 600 mg, Reglan 10 mg, or both  - she should work to remain well hydrated and should work up to consistently receiving 6 hours of sleep or more per night  - she has been keeping track of her migraines using her smart phone which is excellent I would encourage her to continue that practice   - we will request an MRI of the brain to evaluate and ensure that we are not dealing with any secondary cause for headaches considering the multiple changes that she has experienced in the last few months  I will plan to see her back in 10 weeks time but would be happy to see her sooner if the need should arise  HPI:  Marcello Urias is a 45year old woman who presents for evaluation of migraine  Migraine started as a teenager  +FH in [place] ([place] headaches)  Aura: No sensory or visual auras, but + pain features that progress into migraine location  This may manifest as sudden shots or "zings" of pain that shoot down the face and head accompanied by a dull ache lasting for up to an hour and may lead to migraine      Migraine: Comes on over a variable time frame with peak in 30 mins to an hour, frontal, temporal, supraorbital pain, unilateral but not monagamous to one side  + signigicant nausea but no vomiting, + photophobia, + phonophobia, + osmophobia, impaired processing speed, neck pain/aching  Pain is a general constant intense ache with a throbbing and pounding in those specific regions  Typical migraine 8-9/10 recently since December  Before Dec the migraine would last for 2-3 hours without a second dose of Zomig    More recently she is having long stretches where it will not go away entirely but fluctuates in intensity  Frequency: Pre-Dec frequency was different, (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    Stopped nursing recently in 10/2020)  12/2020 episode of 5 in a row requiring prednisone bridge and then a 6th  1/2021: 1 migraine  2/2021: 15 days total, 14 in a row  3/2021: 8 total, 4 days in a row and now in the midst of another 4 days in a row    Often with an additional dull bifrontal ache but this responds well to Ibuprofen    The start of several of the long migraine stretch are often kicked off at the time of menses  Triggers: lack of sleep, prolonged fasting, no particular propensity to one time of day  Preventitives: Inderal (ineffective), Topamax (2009 and initially significant cognitive issues, 2011 tried again and did well on it starting at a low dose and titrated up to 150mg BID    but not much difference above 75mg BID  Marleta Press   ultimately decreased back to 75 and weaned off as she was trying to have children)  Zoloft and Wellbutrin, unclear if it helped with headaches at all  Abortives: Maxalt (ineffective), Imitrex injection (hot flushing, nausea, had to lay for 30 mins in the office but the headache got better but the side effects were not good), Zomig (nasal spary was no better than tablet but oral works well at 5mg    Prior to Dec worked completely but less so now), Ibuprofen (taken with Zomig, but also using 1-2x/day daily)      Past Medical History:   Diagnosis Date    GERD (gastroesophageal reflux disease)     Migraine     Myopic degeneration     PONV (postoperative nausea and vomiting)     Usually is given steroids       Past Surgical History:   Procedure Laterality Date    ANTERIOR CRUCIATE LIGAMENT REPAIR Right      SECTION      DIAGNOSTIC LAPAROSCOPY      X 2    FRACTURE SURGERY Right     Tib/Fib FX    HYSTEROSCOPY      X 5    GA HYSTEROSCOPY,W/ENDO BX N/A 9/10/2018    Procedure: HYSTEROSCOPY; LYSIS OF ADHESIONS; ENDOMETRIAL BIOPSY; Rosina Lobo;  Surgeon: Jessica Mejia DO;  Location: [facility];  Service: Gynecology       Social History     Socioeconomic History    Marital status: /Civil Union     Spouse name: None    Number of children: None    Years of education: None    Highest education level: None   Occupational History    None   Social Needs    Financial resource strain: None    Food insecurity     Worry: None     Inability: None    Transportation needs     Medical: None     Non-medical: None   Tobacco Use    Smoking status: Never Smoker    Smokeless tobacco: Never Used   Substance and Sexual Activity    Alcohol use: Yes     Comment: rarely    Drug use: No    Sexual activity: None   Lifestyle    Physical activity     Days per week: None     Minutes per session: None    Stress: None   Relationships    Social connections     Talks on phone: None     Gets together: None     Attends [employer] service: None     Active member of club or organization: None     Attends meetings of clubs or organizations: None     Relationship status: None    Intimate partner violence     Fear of current or ex partner: None     Emotionally abused: None     Physically abused: None     Forced sexual activity: None   Other Topics Concern    None   Social History Narrative    None         Review of Systems   Constitutional: Negative  Negative for appetite change and fever  HENT: Negative    Negative for hearing loss, tinnitus, trouble swallowing and voice change  Eyes: Negative  Negative for photophobia and pain  Respiratory: Negative  Negative for shortness of breath  Cardiovascular: Negative  Negative for palpitations  Gastrointestinal: Positive for nausea (Intermittent)  Negative for vomiting  Endocrine: Negative  Negative for cold intolerance  Genitourinary: Negative  Negative for dysuria, frequency and urgency  Musculoskeletal: Positive for back pain (Intermittent)  Negative for myalgias and neck pain  Skin: Negative  Negative for rash  Allergic/Immunologic: Negative  Neurological: Positive for headaches  Negative for dizziness, tremors, seizures, syncope, facial asymmetry, speech difficulty, weakness, light-headedness and numbness  Hematological: Negative  Does not bruise/bleed easily  Psychiatric/Behavioral: Negative  Negative for confusion, hallucinations and sleep disturbance  All other systems reviewed and are negative  History reviewed  No pertinent family history  Current Outpatient Medications:     multivitamin (THERAGRAN) TABS, Take 1 tablet by mouth daily, Disp: , Rfl:     omeprazole (PriLOSEC) 40 MG capsule, , Disp: , Rfl:     ondansetron (ZOFRAN) 4 mg tablet, Take 4 mg by mouth every 8 (eight) hours as needed for nausea or vomiting, Disp: , Rfl:     ZOLMitriptan (ZOMIG) 5 MG tablet, 1 tab at onset of migraine, can repeat in 2 hours, max 10mg/24 hours, Disp: 12 tablet, Rfl: 3    Allergies   Allergen Reactions    Ultram [Tramadol]      Hypotensive      Adhesive [Medical Tape] Rash    Neomycin Rash        /82 (BP Location: Left arm, Patient Position: Sitting, Cuff Size: Standard)   Pulse 87     Neurologic exam:  Awake and alert with appropriate mental status and language function  Cranial nerves II-XII were symmetrically intact bilaterally  Motor testing reveals 5/5 strength in the bilateral upper and lower extremities with no drift   Sensation is intact to temperature and vibraiton in the bilateral upper and lower extremities  Coordination testing is unremarkable  Deep tendon reflexes were 2+ and symmetric   fundascopic exam showed sharp disc on the right and scarring on the left (known for her)  She can follow 2 step commands  No dyarthira or aphasia    I have spent 90 minutes with Patient  today including counseling/coordination of care regarding Risks and benefits of tx options, Intructions for management and Impressions

## 2021-03-15 NOTE — TELEPHONE ENCOUNTER
fyi:    Emgality PA has been approved through 9/9/2021  [facility] pharmacy made aware  She will contact pt once med is ready for

## 2021-03-30 ENCOUNTER — HOSPITAL ENCOUNTER (OUTPATIENT)
Dept: MRI IMAGING | Facility: HOSPITAL | Age: 41
Discharge: HOME | End: 2021-03-30
Attending: PSYCHIATRY & NEUROLOGY
Payer: COMMERCIAL

## 2021-03-30 DIAGNOSIS — G43.009 MIGRAINE WITHOUT AURA AND WITHOUT STATUS MIGRAINOSUS, NOT INTRACTABLE: ICD-10-CM

## 2021-03-30 PROCEDURE — 70551 MRI BRAIN STEM W/O DYE: CPT

## 2021-03-30 PROCEDURE — G1004 CDSM NDSC: HCPCS

## 2021-06-16 ENCOUNTER — DOCUMENTATION (OUTPATIENT)
Dept: NEUROSURGERY | Facility: CLINIC | Age: 41
End: 2021-06-16

## 2021-06-16 DIAGNOSIS — G43.809 OTHER MIGRAINE WITHOUT STATUS MIGRAINOSUS, NOT INTRACTABLE: Primary | ICD-10-CM

## 2021-06-16 RX ORDER — ONDANSETRON 4 MG/1
4 TABLET, FILM COATED ORAL EVERY 8 HOURS PRN
Qty: 20 TABLET | Refills: 3 | Status: SHIPPED | OUTPATIENT
Start: 2021-06-16

## 2021-09-17 ENCOUNTER — TELEPHONE (OUTPATIENT)
Dept: NEUROLOGY | Facility: CLINIC | Age: 41
End: 2021-09-17

## 2021-09-17 NOTE — TELEPHONE ENCOUNTER
Received tt from Dr Merino  Pt is doing really well on Emgality  She just went to get her refill in the pharmacy told her that she needs a new prior Auth for some reason even though it has not yet been a year  Chart reviewed:   PA  on 21    Called pt and states emgality has been effective  Before emgality, she gets >15 migraines per month  Now, 3 migraines per month  PA initiated on CMM  Key#[id number]      Awaiting determination          [phone] ok to leave detailed message

## 2021-09-20 NOTE — TELEPHONE ENCOUNTER
Received PA form  PA was already initiated on CMM  Called [facility] to check if additional info is needed  Spoke w/ [place]  Additional info is needed  All questions answered  Urgent PA request turnaround time is 72 hours     Awaiting determination

## 2021-09-21 NOTE — TELEPHONE ENCOUNTER
PA has been approved through 3/19/2022  [address], spoke w/ Rikki and made aware of the approval  She verbalized understanding  She was able to get a paid claim  She will contact this pt when med is ready for

## 2021-12-09 ENCOUNTER — IMMUNIZATIONS (OUTPATIENT)
Dept: FAMILY MEDICINE CLINIC | Facility: HOSPITAL | Age: 41
End: 2021-12-09

## 2021-12-09 DIAGNOSIS — Z23 ENCOUNTER FOR IMMUNIZATION: Primary | ICD-10-CM

## 2021-12-09 PROCEDURE — 91300 COVID-19 PFIZER VACC 0.3 ML: CPT

## 2021-12-09 PROCEDURE — 0001A COVID-19 PFIZER VACC 0.3 ML: CPT

## 2022-03-02 DIAGNOSIS — G43.009 MIGRAINE WITHOUT AURA AND WITHOUT STATUS MIGRAINOSUS, NOT INTRACTABLE: ICD-10-CM

## 2022-03-02 RX ORDER — GALCANEZUMAB 120 MG/ML
1 INJECTION, SOLUTION SUBCUTANEOUS
Qty: 3 ML | Refills: 4 | Status: SHIPPED | OUTPATIENT
Start: 2022-03-02

## 2022-03-30 DIAGNOSIS — G43.709 CHRONIC MIGRAINE WITHOUT AURA WITHOUT STATUS MIGRAINOSUS, NOT INTRACTABLE: ICD-10-CM

## 2022-03-30 RX ORDER — ZOLMITRIPTAN 5 MG/1
TABLET, FILM COATED ORAL
Qty: 12 TABLET | Refills: 0 | Status: SHIPPED | OUTPATIENT
Start: 2022-03-30 | End: 2022-05-03 | Stop reason: SDUPTHER

## 2022-03-31 ENCOUNTER — TELEPHONE (OUTPATIENT)
Dept: NEUROLOGY | Facility: CLINIC | Age: 42
End: 2022-03-31

## 2022-03-31 NOTE — TELEPHONE ENCOUNTER
Patient of Dr Iram Ayala, she is one of our neurology inpatient Pas  Called to advise emgality needs PA for renewal: last auth was from    9/20/2021- 3/19/2022    She reportis emgality has been effective reducing frequency of headaches to   3-5/month from: 8 total, 4 days in a row and now in the midst of another 4 days in a row (or up to 16/month documented 3/2021       She is due on 4/5 for next injection; will submit urgent request     ID # [id number]    best cb [phone]

## 2022-04-01 NOTE — TELEPHONE ENCOUNTER
Recd call from Marilou Reese, had additional questions regarding the PA, answered same; emgality approved; we will be receiving confirmation via fax  Patient aware of same

## 2022-05-03 ENCOUNTER — DOCUMENTATION (OUTPATIENT)
Dept: NEUROLOGY | Facility: CLINIC | Age: 42
End: 2022-05-03

## 2022-05-03 DIAGNOSIS — G43.709 CHRONIC MIGRAINE WITHOUT AURA WITHOUT STATUS MIGRAINOSUS, NOT INTRACTABLE: Primary | ICD-10-CM

## 2022-05-03 RX ORDER — ZOLMITRIPTAN 5 MG/1
TABLET, FILM COATED ORAL
Qty: 12 TABLET | Refills: 0 | Status: SHIPPED | OUTPATIENT
Start: 2022-05-03

## 2022-09-07 ENCOUNTER — OFFICE VISIT (OUTPATIENT)
Dept: OBGYN CLINIC | Facility: MEDICAL CENTER | Age: 42
End: 2022-09-07
Payer: COMMERCIAL

## 2022-09-07 VITALS
BODY MASS INDEX: 28.19 KG/M2 | HEIGHT: 67 IN | HEART RATE: 92 BPM | SYSTOLIC BLOOD PRESSURE: 130 MMHG | DIASTOLIC BLOOD PRESSURE: 75 MMHG

## 2022-09-07 DIAGNOSIS — M25.571 PAIN, JOINT, ANKLE AND FOOT, RIGHT: Primary | ICD-10-CM

## 2022-09-07 PROCEDURE — 99203 OFFICE O/P NEW LOW 30 MIN: CPT | Performed by: EMERGENCY MEDICINE

## 2022-09-07 NOTE — PROGRESS NOTES
Assessment/Plan:    Diagnoses and all orders for this visit:    Pain, joint, ankle and foot, right    Provided home ankle exercises  F/U PRN    Chief Complaint:     Chief Complaint   Patient presents with    Right Ankle - Pain       Subjective:   Patient ID: Tania Hines is a 37 y o  female  Marshall Button presents for right lateral ankle pain since yesterday, states she was sitting on the floor and experienced immediate pain of the ankle when she stepped  Pain is worse WB as well as with eversion and dorsiflexion  Patient is a physician assistant, and notes hx of many ankle sprains in her athletic past   Unfortunately she is going through some difficulties in her pregnancy and declines Xrays, is really just looking for a brace to help her  Review of Systems    The following portions of the patient's chart were reviewed and updated as appropriate:      Allergie  Allergies   Allergen Reactions    Ultram [Tramadol]      Hypotensive      Adhesive [Medical Tape] Rash    Neomycin Rash   s   Allergen Reactions    Ultram [Tramadol]      Hypotensive      Adhesive [Medical Tape] Rash    Neomycin Rash      Diagnosis Date    GERD (gastroesophageal reflux disease)     Migraine     Myopic degeneration     PONV (postoperative nausea and vomiting)     Usually is given steroids       Past Surgical History:   Procedure Laterality Date    ANTERIOR CRUCIATE LIGAMENT REPAIR Right      SECTION      DIAGNOSTIC LAPAROSCOPY      X 2    FRACTURE SURGERY Right     Tib/Fib FX    HYSTEROSCOPY      X 5    MS HYSTEROSCOPY,W/ENDO BX N/A 9/10/2018    Procedure: HYSTEROSCOPY; LYSIS OF ADHESIONS; ENDOMETRIAL BIOPSY; Josefa Hernandez;  Surgeon: Shawna Brice DO;  Location: [facility];  Service: Gynecology       Social History     Socioeconomic History    Marital status: /Civil Union     Spouse name: Not on file    Number of children: Not on file    Years of education: Not on file    Highest education level: Not on file   Occupational History    Not on file   Tobacco Use    Smoking status: Never Smoker    Smokeless tobacco: Never Used   Vaping Use    Vaping Use: Never used   Substance and Sexual Activity    Alcohol use: Yes     Comment: rarely    Drug use: No    Sexual activity: Not on file   Other Topics Concern    Not on file   Social History Narrative    Not on file     Social Determinants of Health     Financial Resource Strain: Not on file   Food Insecurity: Not on file   Transportation Needs: Not on file   Physical Activity: Not on file   Stress: Not on file   Social Connections: Not on file   Intimate Partner Violence: Not on file   Housing Stability: Not on file       No family history on file  Medications:    Current Outpatient Medications:     acetaZOLAMIDE (DIAMOX) 125 mg tablet, Take 125 mg by mouth as needed, Disp: , Rfl:     Galcanezumab-gnlm (Emgality) 120 MG/ML SOAJ, Inject 1 mL under the skin every 30 (thirty) days, Disp: 3 mL, Rfl: 4    ibuprofen (MOTRIN) 200 mg tablet, Take 200 mg by mouth every 6 (six) hours as needed, Disp: , Rfl:     metoclopramide (REGLAN) 10 mg tablet, 1 tab as needed at onset of migraine, can repeat in 8 hours, can combine with triptan/NSAID, Disp: 20 tablet, Rfl: 3    multivitamin (THERAGRAN) TABS, Take 1 tablet by mouth daily, Disp: , Rfl:     omeprazole (PriLOSEC) 40 MG capsule, , Disp: , Rfl:     ondansetron (ZOFRAN) 4 mg tablet, Take 1 tablet (4 mg total) by mouth every 8 (eight) hours as needed for nausea or vomiting, Disp: 20 tablet, Rfl: 3    ZOLMitriptan (ZOMIG) 5 MG tablet, TAKE ONE TAB BY MOUTH AT THE ONSET OF MIGRAINE, CAN REPEAT IN 2 HOURS   MAX 10MG/24 HOURS, Disp: 12 tablet, Rfl: 0    Patient Active Problem List   Diagnosis    Migraine without aura    Myopia    Vitamin D deficiency       Objective:  /75   Pulse 92   Ht 5' 7" (1 702 m)   BMI 28 19 kg/m²     Right Ankle Exam     Tenderness   The patient is experiencing tenderness in the ATF     Tests   Anterior drawer: negative  Varus tilt: negative    Other   Erythema: absent     Comments:  Pain with eversion              Physical Exam      Neurologic Exam    Procedures    There are no pertinent studies obtained with regards to today's office visit

## 2022-09-07 NOTE — PATIENT INSTRUCTIONS
Ankle Sprain   AMBULATORY CARE:   An ankle sprain  happens when 1 or more ligaments in your ankle joint stretch or tear  Ligaments are tough tissues that connect bones  Ligaments support your joints and keep your bones in place  Common symptoms include the following:   Trouble moving your ankle or foot    Pain when you touch or put weight on your ankle    Bruised, swollen, or misshapen ankle  Seek care immediately if:   You have severe pain in your ankle  Your foot or toes are cold or numb  Your ankle becomes more weak or unstable (wobbly)  You are unable to put any weight on your ankle or foot  Your swelling has increased or returned  Contact your healthcare provider if:   Your pain does not go away, even after treatment  You have questions or concerns about your condition or care  Treatment:   Medicines      NSAIDs , such as ibuprofen, help decrease swelling, pain, and fever  This medicine is available with or without a doctor's order  NSAIDs can cause stomach bleeding or kidney problems in certain people  If you take blood thinner medicine, always ask your healthcare provider if NSAIDs are safe for you  Always read the medicine label and follow directions  Acetaminophen  decreases pain  It is available without a doctor's order  Ask how much to take and how often to take it  Follow directions  Acetaminophen can cause liver damage if not taken correctly  Prescription pain medicine  may be given  Ask how to take this medicine safely  Surgery  may be needed to repair or replace a torn ligament if your sprain does not heal with other treatments  Your healthcare provider may use screws to attach the bones in your ankle together  The screws may help support your ankle and make it stable  Ask your healthcare provider for more information about surgery to treat your ankle sprain    Self care:   Use support devices,  such as a brace, cast, or splint, may be needed to limit your movement and protect your joint  You may need to use crutches to decrease your pain as you move around  Go to physical therapy as directed  A physical therapist teaches you exercises to help improve movement and strength, and to decrease pain  Rest  your ankle so that it can heal  Return to normal activities as directed  Apply ice on your ankle for 15 to 20 minutes every hour or as directed  Use an ice pack, or put crushed ice in a plastic bag  Cover it with a towel  Ice helps prevent tissue damage and decreases swelling and pain  Compress  your ankle  Ask if you should wrap an elastic bandage around your injured ligament  An elastic bandage provides support and helps decrease swelling and movement so your joint can heal  Wear as long as directed  Elevate  your ankle above the level of your heart as often as you can  This will help decrease swelling and pain  Prop your ankle on pillows or blankets to keep it elevated comfortably  Prevent another ankle sprain:   Let your ankle heal   Find out how long your ligament needs to heal  Do not do any physical activity until your healthcare provider says it is okay  If you start activity too soon, you may develop a more serious injury  Always warm up and stretch  before you exercise or play sports  Use the right equipment  Always wear shoes that fit well and are made for the activity that you are doing  You may also need ankle supports, elbow and knee pads, or braces  Follow up with your healthcare provider as directed:  Write down your questions so you remember to ask them during your visits  © 2017 [employer] Information is for End User's use only and may not be sold, redistributed or otherwise used for commercial purposes  All illustrations and images included in CareNotes® are the copyrighted property of [employer]  or [employer]  The above information is an  only   It is not intended as medical advice for individual conditions or treatments  Talk to your doctor, nurse or pharmacist before following any medical regimen to see if it is safe and effective for you  Ankle Exercises   AMBULATORY CARE:   What you need to know about ankle exercises: Ankle exercises help strengthen your ankle and improve its function after injury  These are beginning exercises  Ask your healthcare provider if you need to see a physical therapist for more advanced exercises  Do these exercises 3 to 5 days a week , or as directed by your healthcare provider  Ask if you should perform the exercises on each ankle  Do the exercises in the order that your healthcare provider recommends  This will help prevent swelling, chronic pain, and reinjury  Start with range of motion exercises  Then progress to strengthening exercises, and finally to balancing exercises  Warm up before you do ankle exercises  Walk or ride a stationary bike for 5 to 10 minutes to prepare your ankle for movement  Stop if you feel pain  It is normal to feel some discomfort at first  Regular exercise will help decrease your discomfort over time  How to perform range of motion exercises safely:  Begin with range of motion exercises to improve flexibility  Ask your healthcare provider when you can progress to strengthening exercises  Ankle alphabet:  Sit on a chair so that your feet do not touch the floor  Use your big toe to write each letter of the alphabet  Use only your foot and ankle, and keep your movements small  Do 2 sets  Calf stretches:      Sitting calf stretches with a towel:  Sit on the floor with both legs out straight in front of you  Loop a towel around the ball of your injured foot  Grasp the ends of the towel and pull it toward you  Keep your leg and back straight  Do not lean forward as you pull the towel  Hold for 30 seconds  Then relax for 30 seconds  Do 2 sets of 10             Standing calf stretches:  Stand facing a wall with the foot that is not injured forward and your knee slightly bent  Keep the leg with the injured foot straight and behind you with your toes pointed in slightly  With both heels flat on the floor, press your hips forward  Do not arch your back  Hold for 30 seconds, and then relax for 30 seconds  Do 2 sets of 10  Repeat with your leg bent  Do 2 sets of 10  How to perform strengthening exercises safely:  After you can perform range of motion exercises without pain, you may begin strengthening exercises  Ask your healthcare provider when you can progress to balancing exercises  Ankle movement in 4 directions:  Sit on the floor with your legs straight in front of you  Keep your heels on the floor for support  Dorsiflexion:  Begin with your toes pointing straight up  Pull your toes toward your body  Slowly return to the starting position  Do 3 sets of 5  Plantar flexion:  Begin with your toes pointing straight up  Push your toes away from your body  Slowly return to the starting position  Do 3 sets of 5  Inversion:  Begin with your toes pointing straight up  Push your toes inward, toward each other  Slowly return to the starting position  Do 3 sets of 5  Eversion:  Begin with your toes pointing straight up  Push your toes outward, away from each other  Slowly return to the starting position  Do 3 sets of 5  Toe curls with a towel:  Sit on a chair so that both of your feet are flat on the floor  Place a small towel on the floor in front of your injured foot  Grab the center of the towel with your toes and curl the towel toward you  Relax and repeat  Do 1 set of 5  [place] pick-ups:  Sit on a chair so that both of your feet are flat on the floor  Place 20 marbles on the floor in front of your injured foot  Use your toes to  one marble at a time and place it into a bowl  Repeat until you have picked up all the marbles  Do 1 set       Heel raises:      Single leg heel raises: Stand with your weight evenly on both feet  Hold on to a chair or a wall for balance  Lift the foot that is not injured off the floor so all your weight is placed on your injured foot  Raise the heel of your injured foot as high as you can  Slowly lower your heel to the floor  Do 1 set of 10  Double leg heel raises:  Stand with your weight evenly on both feet  Hold on to a chair or a wall for balance  Raise both of your heels as high as you can  Slowly lower your heels to the floor  Do 1 set of 10  Heel and toe walks:      Heel walks:  Begin in a standing position  Lift your toes off the floor and walk on your heels  Keep your toes lifted as high as possible  Do 2 sets of 10  Toe walks:  Begin in a standing position  Lift your heels off the floor and walk on the balls and toes of your feet  Keep your heels lifted as high as possible  Do 2 sets of 10  How to perform a balance exercise safely:  After you can perform strengthening exercises without pain, you may do this beginning balancing exercise  Ask your healthcare provider for more advanced balance exercises  Single leg stance:  Stand with your weight evenly on both feet, or hold on to a chair or a wall  Do not lean to the side  Lift the foot that is not injured off the floor so all your weight is placed on your injured foot  Balance on your injured foot  Ask your healthcare provider how long to hold this position  Contact your healthcare provider if:   Your pain becomes worse  You have new pain  You have questions or concerns about your condition, care, or exercise program   © 2017 [employer] Information is for End User's use only and may not be sold, redistributed or otherwise used for commercial purposes  All illustrations and images included in CareNotes® are the copyrighted property of [employer]  or [employer]  The above information is an  only   It is not intended as medical advice for individual conditions or treatments  Talk to your doctor, nurse or pharmacist before following any medical regimen to see if it is safe and effective for you

## 2022-10-03 DIAGNOSIS — G43.809 OTHER MIGRAINE WITHOUT STATUS MIGRAINOSUS, NOT INTRACTABLE: ICD-10-CM

## 2022-10-03 DIAGNOSIS — G43.709 CHRONIC MIGRAINE WITHOUT AURA WITHOUT STATUS MIGRAINOSUS, NOT INTRACTABLE: ICD-10-CM

## 2022-10-03 RX ORDER — ZOLMITRIPTAN 5 MG/1
TABLET, FILM COATED ORAL
Qty: 12 TABLET | Refills: 5 | Status: SHIPPED | OUTPATIENT
Start: 2022-10-03

## 2022-10-03 RX ORDER — ONDANSETRON 4 MG/1
TABLET, FILM COATED ORAL
Qty: 20 TABLET | Refills: 5 | Status: SHIPPED | OUTPATIENT
Start: 2022-10-03

## 2022-10-21 DIAGNOSIS — G43.009 MIGRAINE WITHOUT AURA AND WITHOUT STATUS MIGRAINOSUS, NOT INTRACTABLE: Primary | ICD-10-CM

## 2022-10-21 RX ORDER — PREDNISONE 20 MG/1
TABLET ORAL
Qty: 18 TABLET | Refills: 0 | Status: SHIPPED | OUTPATIENT
Start: 2022-10-21

## 2022-10-21 NOTE — PROGRESS NOTES
Spoke to [facility] by phone this  Over the course of the last 2 weeks she has had E significant headache days including 3 days where her migraines were quite debilitating  Her abortive medications have worked each time to basically eliminate the migraine entirely, but this is a significant takeoff from her prior baseline  She has had some recent challenges with regard to having an unplanned pregnancy/ miscarriage, and has also had some significant psychosocial stressors  This combination including hormonal changes likely has triggered a flare up cycle of her migraines  At this time we will use a steroid bridge to help break her current migraine cycle  The migraines are not particularly monogamous to anyone time of the day to, so we will use prednisone beginning at 80 mg in the morning and decreasing by 10 mg per day  She will reach out to let us know how she is doing at her discretion

## 2023-01-10 ENCOUNTER — TELEMEDICINE (OUTPATIENT)
Dept: NEUROLOGY | Facility: CLINIC | Age: 43
End: 2023-01-10

## 2023-01-10 VITALS — WEIGHT: 188 LBS | HEIGHT: 67 IN | BODY MASS INDEX: 29.51 KG/M2

## 2023-01-10 DIAGNOSIS — G43.709 CHRONIC MIGRAINE WITHOUT AURA WITHOUT STATUS MIGRAINOSUS, NOT INTRACTABLE: ICD-10-CM

## 2023-01-10 DIAGNOSIS — G43.009 MIGRAINE WITHOUT AURA AND WITHOUT STATUS MIGRAINOSUS, NOT INTRACTABLE: Primary | ICD-10-CM

## 2023-01-10 RX ORDER — RANIBIZUMAB 6 MG/ML
INJECTION, SOLUTION INTRAVITREAL
COMMUNITY

## 2023-01-10 RX ORDER — ZOLMITRIPTAN 5 MG/1
TABLET, FILM COATED ORAL
Qty: 12 TABLET | Refills: 5 | Status: SHIPPED | OUTPATIENT
Start: 2023-01-10

## 2023-01-10 RX ORDER — DEXLANSOPRAZOLE 60 MG/1
60 CAPSULE, DELAYED RELEASE ORAL DAILY
COMMUNITY
Start: 2023-01-08

## 2023-01-10 NOTE — PATIENT INSTRUCTIONS
Migraines: [facility] presents for follow-up evaluation with regard to migraine headaches and reports that she is doing quite well  She has 2 breakthroughs per month which are treated quite effectively with her current combination of abortives  She is not currently interested in making an adjustment to her preventive therapy which is quite reasonable  -For prevention she should continue Emgality at injection rate of once per month  -As abortive therapy she can use Zomig 5 mg tablets which should be taken as early as possible during the course of migraine  The dose can be repeated 1 time with a maximum of 2 doses in a 24-hour timeframe  To Zomig she can add 1 dose of Zofran as well as 600 mg of ibuprofen  -She should work to ensure that she gets adequate rest, hydration, and physical exercise as always things can help to reduce the frequency and severity of migraine   -She should consider keeping track of her occasional breakthroughs to ensure that there is no specific pattern or trigger  -She can retain the prednisone medication which she received previously  If she does break into a significant migraine cycle this can be used according to the directions on the prescription label  She will return to the office in 1 years time to see me directly

## 2023-01-10 NOTE — PROGRESS NOTES
Virtual Regular Visit    Verification of patient location:     Patient is located in the following state in which I hold an active license [place]      Assessment/Plan:   Patient Instructions   Migraines: Valdemar Evans presents for follow-up evaluation with regard to migraine headaches and reports that she is doing quite well  She has 2 breakthroughs per month which are treated quite effectively with her current combination of abortives  She is not currently interested in making an adjustment to her preventive therapy which is quite reasonable  -For prevention she should continue Emgality at injection rate of once per month  -As abortive therapy she can use Zomig 5 mg tablets which should be taken as early as possible during the course of migraine  The dose can be repeated 1 time with a maximum of 2 doses in a 24-hour timeframe  To Zomig she can add 1 dose of Zofran as well as 600 mg of ibuprofen  -She should work to ensure that she gets adequate rest, hydration, and physical exercise as always things can help to reduce the frequency and severity of migraine   -She should consider keeping track of her occasional breakthroughs to ensure that there is no specific pattern or trigger  -She can retain the prednisone medication which she received previously  If she does break into a significant migraine cycle this can be used according to the directions on the prescription label  She will return to the office in 1 years time to see me directly            Problem List Items Addressed This Visit        Cardiovascular and Mediastinum    Migraine without aura - Primary    Relevant Medications    ZOLMitriptan (ZOMIG) 5 MG tablet   Other Visit Diagnoses     Chronic migraine without aura without status migrainosus, not intractable        Relevant Medications    ZOLMitriptan (ZOMIG) 5 MG tablet               Reason for visit is   Chief Complaint   Patient presents with   • Virtual Regular Visit        Encounter provider Camila Merchant Yonas Pruett MD    Provider located at [address] [place]  [address] [place] [address]  [phone]      Recent Visits  No visits were found meeting these conditions  Showing recent visits within past 7 days and meeting all other requirements  Today's Visits  Date Type Provider Dept   01/10/23 Telemedicine Matilda Dunham MD [facility] today's visits and meeting all other requirements  Future Appointments  No visits were found meeting these conditions  Showing future appointments within next 150 days and meeting all other requirements       The patient was identified by name and date of birth  Eliud Corona was informed that this is a telemedicine visit and that the visit is being conducted through the [employer]  She agrees to proceed     My office door was closed  The patient was notified the following individuals were present in the room [facility] and Remington Kuo  She acknowledged consent and understanding of privacy and security of the video platform  The patient has agreed to participate and understands they can discontinue the visit at any time  Patient is aware this is a billable service  Subjective    HPI    Since your last visit are your headaches Improved    If better or worse, please explain: Na    Are you taking your current medications as prescribed? yes    If no, why not? NA    Do you have any side effects? no    How often do you use abortive medications to treat a headache? 2-4  times per month  How effective are they? effective    From 0-10, how severe is the pain for a typical headache for you?  8    What does your typical headache pain feel like? throbbing    Where is your typical headache? left-sided unilateral    What is your current headache frequency: 2-4 times per month    How long does your typical headache last? 6-8 hour(s)    In addition to the head pain, what other symptoms do you have before or during your headaches? nausea and flashes in vision, sensitive scalp,photophobia    Do you have anything you need to discuss with the doctor during your visit? RUBY      Jackie Soriano presents for follow-up evaluation with regard to her known migraines  She had 1 significant episode of breakthrough migraines which occurred in October of this year  At that time we prescribed a course of steroids to break her cycle however this cycle broke just prior to initiating the medication  Since then she reports that she is doing quite well with 2 breakthrough migraines a month which are treated quite effectively with a combination of Zomig, ibuprofen, Zofran  She prefers to avoid using Reglan if possible  She has had some significant psychosocial stressors but feels that she is compartmentalizing well  She had no specific concerns today and feels that her migraine treatment is quite helpful  She denies any side effects on her current medication and in particular, other than some pain at the injection site, is doing very well on Emgality with no significant pattern of breakthroughs during her wearing off time            Past Medical History:   Diagnosis Date   • GERD (gastroesophageal reflux disease)    • Migraine    • Myopic degeneration    • PONV (postoperative nausea and vomiting)     Usually is given steroids       Past Surgical History:   Procedure Laterality Date   • ANTERIOR CRUCIATE LIGAMENT REPAIR Right    •  SECTION     • DIAGNOSTIC LAPAROSCOPY      X 2   • FRACTURE SURGERY Right     Tib/Fib FX   • HYSTEROSCOPY      X 5   • OR HYSTEROSCOPY BX ENDOMETRIUM&/POLYPC W/WO D&C N/A 9/10/2018    Procedure: HYSTEROSCOPY; LYSIS OF ADHESIONS; ENDOMETRIAL BIOPSY; Elva Wheeler;  Surgeon: Cesar Lund DO;  Location: [facility];  Service: Gynecology       Current Outpatient Medications   Medication Sig Dispense Refill   • acetaZOLAMIDE (DIAMOX) 125 mg tablet Take 125 mg by mouth as needed • Calcium Carbonate-Vitamin D (CALCIUM-CARB 600 + D PO) Take 2 tablets by mouth     • dexlansoprazole (DEXILANT) 60 MG capsule Take 60 mg by mouth in the morning     • Emgality 120 MG/ML SOAJ INJECT 1 ML UNDER THE SKIN EVERY 30 DAYS 1 mL 11   • FERROUS SULFATE ER PO Take by mouth     • ibuprofen (MOTRIN) 200 mg tablet Take 200 mg by mouth every 6 (six) hours as needed     • multivitamin (THERAGRAN) TABS Take 1 tablet by mouth daily     • ondansetron (ZOFRAN) 4 mg tablet TAKE ONE TABLET BY MOUTH EVERY 8 HOURS AS NEEDED FOR NAUSEA OR VOMITING 20 tablet 5   • predniSONE 20 mg tablet 4 tabs on day 1 and decrease by 1/2 tab per day X 8 days 18 tablet 0   • Ranibizumab (Lucentis) 0 3 MG/0 05ML SOSY Apply to eye     • ZOLMitriptan (ZOMIG) 5 MG tablet TAKE ONETAB BY MOUTH AT THE ONSET OF MIGRAINE, CAN REPEAT IN 2 HOURS  MAX 10MG/24 HOURS 12 tablet 5   • omeprazole (PriLOSEC) 40 MG capsule  (Patient not taking: Reported on 1/10/2023)       No current facility-administered medications for this visit  Allergies   Allergen Reactions   • Morphine Itching   • Ultram [Tramadol]      Hypotensive     • Adhesive [Medical Tape] Rash   • Neomycin Rash       Review of Systems   Constitutional: Negative  Negative for appetite change and fever  HENT: Negative  Negative for hearing loss, tinnitus, trouble swallowing and voice change  Eyes: Negative  Negative for photophobia, pain and visual disturbance  Respiratory: Negative  Negative for shortness of breath  Cardiovascular: Negative  Negative for palpitations  Gastrointestinal: Negative  Negative for nausea and vomiting  Endocrine: Negative  Negative for cold intolerance  Genitourinary: Negative  Negative for dysuria, frequency and urgency  Musculoskeletal: Negative  Negative for gait problem, myalgias and neck pain  Skin: Negative  Negative for rash  Allergic/Immunologic: Negative  Neurological: Negative    Negative for dizziness, tremors, seizures, syncope, facial asymmetry, speech difficulty, weakness, light-headedness, numbness and headaches  Hematological: Negative  Does not bruise/bleed easily  Psychiatric/Behavioral: Negative  Negative for confusion, hallucinations and sleep disturbance  Video Exam    Vitals:    01/10/23 0745   Weight: 85 3 kg (188 lb)   Height: 5' 7" (1 702 m)       Physical Exam     During our discussion she was awake, alert, and in no distress  She is an outstanding historian with no obvious dysarthria or aphasia  There were no clear cranial neuropathies or lateralizing signs

## 2023-01-25 DIAGNOSIS — G43.709 CHRONIC MIGRAINE WITHOUT AURA WITHOUT STATUS MIGRAINOSUS, NOT INTRACTABLE: ICD-10-CM

## 2023-01-25 RX ORDER — ZOLMITRIPTAN 5 MG/1
TABLET, FILM COATED ORAL
Qty: 12 TABLET | Refills: 5 | Status: SHIPPED | OUTPATIENT
Start: 2023-01-25

## 2023-08-31 DIAGNOSIS — G43.009 MIGRAINE WITHOUT AURA AND WITHOUT STATUS MIGRAINOSUS, NOT INTRACTABLE: ICD-10-CM

## 2023-08-31 RX ORDER — GALCANEZUMAB 120 MG/ML
INJECTION, SOLUTION SUBCUTANEOUS
Qty: 1 ML | Refills: 0 | Status: CANCELLED | OUTPATIENT
Start: 2023-08-31

## 2023-09-01 ENCOUNTER — DOCUMENTATION (OUTPATIENT)
Dept: NEUROLOGY | Facility: CLINIC | Age: 43
End: 2023-09-01

## 2023-09-01 DIAGNOSIS — G43.009 MIGRAINE WITHOUT AURA AND WITHOUT STATUS MIGRAINOSUS, NOT INTRACTABLE: ICD-10-CM

## 2023-09-01 RX ORDER — GALCANEZUMAB 120 MG/ML
INJECTION, SOLUTION SUBCUTANEOUS
Qty: 1 ML | Refills: 11 | Status: SHIPPED | OUTPATIENT
Start: 2023-09-01

## 2023-09-05 ENCOUNTER — APPOINTMENT (OUTPATIENT)
Dept: LAB | Facility: HOSPITAL | Age: 43
End: 2023-09-05

## 2023-09-05 DIAGNOSIS — Z00.8 HEALTH EXAMINATION IN POPULATION SURVEY: ICD-10-CM

## 2023-09-05 LAB
CHOLEST SERPL-MCNC: 158 MG/DL
HDLC SERPL-MCNC: 46 MG/DL
LDLC SERPL CALC-MCNC: 91 MG/DL (ref 0–100)
NONHDLC SERPL-MCNC: 112 MG/DL
TRIGL SERPL-MCNC: 107 MG/DL

## 2023-09-05 PROCEDURE — 83036 HEMOGLOBIN GLYCOSYLATED A1C: CPT

## 2023-09-05 PROCEDURE — 36415 COLL VENOUS BLD VENIPUNCTURE: CPT

## 2023-09-05 PROCEDURE — 80061 LIPID PANEL: CPT

## 2023-09-06 LAB
EST. AVERAGE GLUCOSE BLD GHB EST-MCNC: 123 MG/DL
HBA1C MFR BLD: 5.9 %

## 2023-12-15 ENCOUNTER — LAB REQUISITION (OUTPATIENT)
Dept: LAB | Facility: HOSPITAL | Age: 43
End: 2023-12-15
Payer: COMMERCIAL

## 2023-12-15 DIAGNOSIS — D22.61 MELANOCYTIC NEVI OF RIGHT UPPER LIMB, INCLUDING SHOULDER: ICD-10-CM

## 2023-12-15 DIAGNOSIS — D22.62 MELANOCYTIC NEVI OF LEFT UPPER LIMB, INCLUDING SHOULDER: ICD-10-CM

## 2023-12-15 PROCEDURE — 88305 TISSUE EXAM BY PATHOLOGIST: CPT | Performed by: PATHOLOGY

## 2023-12-20 PROCEDURE — 88305 TISSUE EXAM BY PATHOLOGIST: CPT | Performed by: PATHOLOGY

## 2024-01-08 DIAGNOSIS — G43.809 OTHER MIGRAINE WITHOUT STATUS MIGRAINOSUS, NOT INTRACTABLE: ICD-10-CM

## 2024-01-08 RX ORDER — ONDANSETRON 4 MG/1
TABLET, FILM COATED ORAL
Qty: 20 TABLET | Refills: 3 | Status: SHIPPED | OUTPATIENT
Start: 2024-01-08

## 2024-01-18 ENCOUNTER — OFFICE VISIT (OUTPATIENT)
Dept: URGENT CARE | Facility: CLINIC | Age: 44
End: 2024-01-18
Payer: COMMERCIAL

## 2024-01-18 VITALS
TEMPERATURE: 98.3 F | HEART RATE: 94 BPM | RESPIRATION RATE: 16 BRPM | DIASTOLIC BLOOD PRESSURE: 80 MMHG | OXYGEN SATURATION: 99 % | SYSTOLIC BLOOD PRESSURE: 130 MMHG

## 2024-01-18 DIAGNOSIS — J02.9 SORE THROAT: ICD-10-CM

## 2024-01-18 DIAGNOSIS — R39.9 UTI SYMPTOMS: Primary | ICD-10-CM

## 2024-01-18 DIAGNOSIS — U07.1 COVID-19: ICD-10-CM

## 2024-01-18 DIAGNOSIS — R30.0 DYSURIA: ICD-10-CM

## 2024-01-18 LAB
S PYO AG THROAT QL: NEGATIVE
SARS-COV-2 AG UPPER RESP QL IA: POSITIVE
SL AMB  POCT GLUCOSE, UA: NEGATIVE
SL AMB LEUKOCYTE ESTERASE,UA: NEGATIVE
SL AMB POCT BILIRUBIN,UA: NEGATIVE
SL AMB POCT BLOOD,UA: ABNORMAL
SL AMB POCT CLARITY,UA: CLEAR
SL AMB POCT COLOR,UA: YELLOW
SL AMB POCT KETONES,UA: NEGATIVE
SL AMB POCT NITRITE,UA: NEGATIVE
SL AMB POCT PH,UA: 6.5
SL AMB POCT SPECIFIC GRAVITY,UA: 1.01
SL AMB POCT URINE PROTEIN: NEGATIVE
SL AMB POCT UROBILINOGEN: 0.2
VALID CONTROL: ABNORMAL

## 2024-01-18 PROCEDURE — 87070 CULTURE OTHR SPECIMN AEROBIC: CPT

## 2024-01-18 PROCEDURE — 87086 URINE CULTURE/COLONY COUNT: CPT

## 2024-01-18 PROCEDURE — 81002 URINALYSIS NONAUTO W/O SCOPE: CPT

## 2024-01-18 PROCEDURE — 99213 OFFICE O/P EST LOW 20 MIN: CPT

## 2024-01-18 PROCEDURE — 87880 STREP A ASSAY W/OPTIC: CPT

## 2024-01-18 PROCEDURE — 87811 SARS-COV-2 COVID19 W/OPTIC: CPT

## 2024-01-18 RX ORDER — FLUTICASONE PROPIONATE 50 MCG
1 SPRAY, SUSPENSION (ML) NASAL DAILY
Qty: 11.1 G | Refills: 0 | Status: SHIPPED | OUTPATIENT
Start: 2024-01-18

## 2024-01-18 RX ORDER — METHYLPREDNISOLONE 4 MG/1
TABLET ORAL
Qty: 21 TABLET | Refills: 0 | Status: SHIPPED | OUTPATIENT
Start: 2024-01-18

## 2024-01-18 NOTE — PROGRESS NOTES
[facility]        NAME: Jackie Hdz is a 43 y.o. female  : 1980    MRN: [id number]  DATE: 2024  TIME: 9:05 AM    Assessment and Plan   UTI symptoms [R39.9]  1. UTI symptoms  Urine culture      2. COVID-19  fluticasone (FLONASE) 50 mcg/act nasal spray    methylPREDNISolone 4 MG tablet therapy pack    POCT urine dip    POCT rapid strepA    Poct Covid 19 Rapid Antigen Test      3. Dysuria        4. Sore throat  Throat culture            Patient Instructions   Rest and increased fluids  Flonase nasal spray one spray in each nostril  Mucinex for congestion  Delsym cough medication as prescribed  Continue tylenol or motrin  Your Covid test was positive   Rapid strep was negative. But I will send a culture  Take the steroids as directed  Your urine had small amount of blood  I will sent it for a culture  You need to mask for the next 5 days when around anyone  Follow up with PCP in 3-5 days.  Proceed to  ER if symptoms worsen.    Chief Complaint     Chief Complaint   Patient presents with    Sore Throat     Pt reports sore throat, sinus congestion, bilateral ear pain, and productive cough with white mucus. States onset of symptoms six days ago. Denies testing at home for Covid. Denies any fever. C/o chills. Managing symptoms with Tylenol and Ibuprofen.     Possible UTI     Pt reports uti symptoms for one month. C/o urgency.          History of Present Illness       This is a 43 year old female who presents with sore throat, nasal congestion, thick phlegm in her throat, and back pain that started 6 days ago. Last night her R ear started bothering her.  Her son is sick at home with the same thing. She has taken motrin and tylenol. She also has irritation when she voids which is a long standing issue.  Patietn is requesting to be tested for   UTI    Sore Throat   Associated symptoms include congestion, coughing and ear pain (R). Pertinent negatives include no shortness of breath.       Review  of Systems   Review of Systems   Constitutional:  Positive for fatigue. Negative for chills and fever.   HENT:  Positive for congestion, ear pain (R) and sore throat.    Eyes: Negative.    Respiratory:  Positive for cough. Negative for shortness of breath.    Cardiovascular:  Negative for chest pain and leg swelling.   Genitourinary: Negative.    Musculoskeletal:  Positive for back pain and myalgias.         Current Medications       Current Outpatient Medications:     acetaZOLAMIDE (DIAMOX) 125 mg tablet, Take 125 mg by mouth as needed, Disp: , Rfl:     Calcium Carbonate-Vitamin D (CALCIUM-CARB 600 + D PO), Take 2 tablets by mouth, Disp: , Rfl:     dexlansoprazole (DEXILANT) 60 MG capsule, Take 60 mg by mouth in the morning, Disp: , Rfl:     FERROUS SULFATE ER PO, Take by mouth, Disp: , Rfl:     fluticasone (FLONASE) 50 mcg/act nasal spray, 1 spray into each nostril daily, Disp: 11.1 g, Rfl: 0    ibuprofen (MOTRIN) 200 mg tablet, Take 200 mg by mouth every 6 (six) hours as needed, Disp: , Rfl:     methylPREDNISolone 4 MG tablet therapy pack, Use as directed on package, Disp: 21 tablet, Rfl: 0    multivitamin (THERAGRAN) TABS, Take 1 tablet by mouth daily, Disp: , Rfl:     ondansetron (ZOFRAN) 4 mg tablet, TAKE ONE TABLET BY MOUTH EVERY 8 HOURS AS NEEDED FOR NAUSEA OR VOMITING, Disp: 20 tablet, Rfl: 3    Ranibizumab (Lucentis) 0.3 MG/0.05ML SOSY, Apply to eye, Disp: , Rfl:     ZOLMitriptan (ZOMIG) 5 MG tablet, TAKE ONETAB BY MOUTH AT THE ONSET OF MIGRAINE, CAN REPEAT IN 2 HOURS. MAX 10MG/24 HOURS, Disp: 12 tablet, Rfl: 5    Emgality 120 MG/ML SOAJ, INJECT 1ML UNDER THE SKIN EVERY 30 DAYS (Patient not taking: Reported on 1/18/2024), Disp: 1 mL, Rfl: 11    omeprazole (PriLOSEC) 40 MG capsule, , Disp: , Rfl:     predniSONE 20 mg tablet, 4 tabs on day 1 and decrease by 1/2 tab per day X 8 days (Patient not taking: Reported on 1/18/2024), Disp: 18 tablet, Rfl: 0    Current Allergies     Allergies as of 01/18/2024 -  Reviewed 2024   Allergen Reaction Noted    Morphine Itching 2013    Ultram [tramadol]  09/10/2018    Adhesive [medical tape] Rash 2018    Neomycin Rash 2018            The following portions of the patient's history were reviewed and updated as appropriate: allergies, current medications, past family history, past medical history, past social history, past surgical history and problem list.     Past Medical History:   Diagnosis Date    GERD (gastroesophageal reflux disease)     Migraine     Myopic degeneration     PONV (postoperative nausea and vomiting)     Usually is given steroids       Past Surgical History:   Procedure Laterality Date    ANTERIOR CRUCIATE LIGAMENT REPAIR Right      SECTION      DIAGNOSTIC LAPAROSCOPY      X 2    FRACTURE SURGERY Right     Tib/Fib FX    HYSTEROSCOPY      X 5    MO HYSTEROSCOPY BX ENDOMETRIUM&/POLYPC W/WO D&C N/A 9/10/2018    Procedure: HYSTEROSCOPY; LYSIS OF ADHESIONS; ENDOMETRIAL BIOPSY; MYOSURE;  Surgeon: Tara Budinetz, DO;  Location: [facility];  Service: Gynecology       History reviewed. No pertinent family history.      Medications have been verified.        Objective   /80 (BP Location: Left arm, Patient Position: Sitting)   Pulse 94   Temp 98.3 °F (36.8 °C)   Resp 16   SpO2 99%   No LMP recorded.       Physical Exam     Physical Exam  Constitutional:       Appearance: She is normal weight. She is ill-appearing.   HENT:      Head: Normocephalic and atraumatic.      Right Ear: Tympanic membrane and ear canal normal. Tenderness present. No swelling. Tympanic membrane is not erythematous.      Left Ear: Tympanic membrane and ear canal normal. No drainage, swelling or tenderness. Tympanic membrane is not erythematous.      Nose: Congestion and rhinorrhea present.      Mouth/Throat:      Mouth: Mucous membranes are dry.      Tonsils: No tonsillar exudate or tonsillar abscesses.   Eyes:      Conjunctiva/sclera: Conjunctivae normal.       Pupils: Pupils are equal, round, and reactive to light.   Cardiovascular:      Rate and Rhythm: Normal rate and regular rhythm.      Heart sounds: Normal heart sounds.   Pulmonary:      Effort: Pulmonary effort is normal.      Breath sounds: Normal breath sounds.   Abdominal:      General: Bowel sounds are normal.      Palpations: Abdomen is soft.   Musculoskeletal:      Cervical back: Normal range of motion and neck supple.   Skin:     General: Skin is warm and dry.      Capillary Refill: Capillary refill takes less than 2 seconds.   Neurological:      Mental Status: She is alert.

## 2024-01-18 NOTE — PATIENT INSTRUCTIONS
Rest and increased fluids  Flonase nasal spray one spray in each nostril  Mucinex for congestion  Delsym cough medication as prescribed  Continue tylenol or motrin  Your Covid test was positive   Rapid strep was negative. But I will send a culture  Take the steroids as directed  You need to mask for the next 5 days when around anyone

## 2024-01-18 NOTE — LETTER
January 18, 2024     Patient: Jackie Hdz   YOB: 1980   Date of Visit: 1/18/2024       To Whom It May Concern:    It is my medical opinion that Jackie Hdz may return to work on 0120/24 and must wear a mask .    If you have any questions or concerns, please don't hesitate to call.         Sincerely,        TANYA Patino    CC: No Recipients

## 2024-01-19 LAB — BACTERIA UR CULT: NORMAL

## 2024-01-20 LAB — BACTERIA THROAT CULT: NORMAL

## 2024-03-07 DIAGNOSIS — G43.709 CHRONIC MIGRAINE WITHOUT AURA WITHOUT STATUS MIGRAINOSUS, NOT INTRACTABLE: ICD-10-CM

## 2024-03-09 RX ORDER — ZOLMITRIPTAN 5 MG/1
TABLET, FILM COATED ORAL
Qty: 12 TABLET | Refills: 5 | Status: SHIPPED | OUTPATIENT
Start: 2024-03-09

## 2024-08-09 ENCOUNTER — TELEPHONE (OUTPATIENT)
Dept: NEUROLOGY | Facility: CLINIC | Age: 44
End: 2024-08-09

## 2024-08-09 DIAGNOSIS — G43.009 MIGRAINE WITHOUT AURA AND WITHOUT STATUS MIGRAINOSUS, NOT INTRACTABLE: ICD-10-CM

## 2024-08-14 RX ORDER — GALCANEZUMAB 120 MG/ML
1 INJECTION, SOLUTION SUBCUTANEOUS
Qty: 1 ML | Refills: 11 | Status: SHIPPED | OUTPATIENT
Start: 2024-08-14

## 2024-08-14 NOTE — TELEPHONE ENCOUNTER
"Last visit Dr. Jameson, 1/10/2023  Patient is PA with neurology    I called patient to clarify request for emgality authorization.    Patient clarified she had  stopped taking emgality approx 11 months ago as had wanted to trial off and said had been doing well until recently when frequency of migraines began to increase.  She said she restarted emgality this month (8/6) as she had a syringe left from prior and denies migraine ever since and said she was now doing \"really well\". Denies headache currently.    She would like to continue emgality; said she forgot about loading dose when restarting and only took one pen on 8/6.    Dr. Jameson:  Do you need to see patient and if in agreement to restart emgality, please send prescription  with instructions;  clinical team will then submit a new PA.  Thanks for your help.   "

## 2024-08-14 NOTE — TELEPHONE ENCOUNTER
Jackie contacted the office and indicated that her migraine syndrome has started to increase once again.  She had 1 syringe of Emgality left from when she had been taking it previously, and at the time she did have an outstanding response to the medication.  Subsequent to that 1 dose (no repeat loading dose) she is reportedly doing much better.    Bearing this in mind and considering the increase in her migraine frequency we will restart Emgality at a standard dose every 30 days.  Office follow-up will be arranged.

## 2024-08-21 ENCOUNTER — TELEPHONE (OUTPATIENT)
Dept: NEUROLOGY | Facility: CLINIC | Age: 44
End: 2024-08-21

## 2024-08-26 ENCOUNTER — PATIENT MESSAGE (OUTPATIENT)
Dept: NEUROLOGY | Facility: CLINIC | Age: 44
End: 2024-08-26

## 2024-09-04 DIAGNOSIS — G43.709 CHRONIC MIGRAINE WITHOUT AURA WITHOUT STATUS MIGRAINOSUS, NOT INTRACTABLE: ICD-10-CM

## 2024-09-04 DIAGNOSIS — G43.009 MIGRAINE WITHOUT AURA AND WITHOUT STATUS MIGRAINOSUS, NOT INTRACTABLE: Primary | ICD-10-CM

## 2024-09-04 RX ORDER — ZOLMITRIPTAN 5 MG/1
TABLET, ORALLY DISINTEGRATING ORAL
Qty: 12 TABLET | Refills: 5 | Status: SHIPPED | OUTPATIENT
Start: 2024-09-04

## 2024-09-04 NOTE — TELEPHONE ENCOUNTER
Patient is requesting prescription for zomig 5 mg tablets be changed to zomig ODT.    Please review script for accuracy and sign if in agreement, thanks you.

## 2024-09-10 ENCOUNTER — APPOINTMENT (OUTPATIENT)
Dept: LAB | Facility: CLINIC | Age: 44
End: 2024-09-10

## 2024-09-10 DIAGNOSIS — Z00.8 ENCOUNTER FOR OTHER GENERAL EXAMINATION: ICD-10-CM

## 2024-09-10 LAB
CHOLEST SERPL-MCNC: 160 MG/DL
EST. AVERAGE GLUCOSE BLD GHB EST-MCNC: 117 MG/DL
HBA1C MFR BLD: 5.7 %
HDLC SERPL-MCNC: 48 MG/DL
LDLC SERPL CALC-MCNC: 88 MG/DL (ref 0–100)
NONHDLC SERPL-MCNC: 112 MG/DL
TRIGL SERPL-MCNC: 122 MG/DL

## 2024-09-10 PROCEDURE — 36415 COLL VENOUS BLD VENIPUNCTURE: CPT

## 2024-09-10 PROCEDURE — 80061 LIPID PANEL: CPT

## 2024-09-10 PROCEDURE — 83036 HEMOGLOBIN GLYCOSYLATED A1C: CPT

## 2024-12-20 ENCOUNTER — APPOINTMENT (OUTPATIENT)
Age: 44
End: 2024-12-20
Payer: COMMERCIAL

## 2024-12-20 ENCOUNTER — EVALUATION (OUTPATIENT)
Dept: PHYSICAL THERAPY | Facility: CLINIC | Age: 44
End: 2024-12-20
Payer: COMMERCIAL

## 2024-12-20 ENCOUNTER — OFFICE VISIT (OUTPATIENT)
Age: 44
End: 2024-12-20
Payer: COMMERCIAL

## 2024-12-20 DIAGNOSIS — M25.562 LEFT KNEE PAIN, UNSPECIFIED CHRONICITY: Primary | ICD-10-CM

## 2024-12-20 DIAGNOSIS — M54.50 LUMBAR SPINE PAIN: Primary | ICD-10-CM

## 2024-12-20 DIAGNOSIS — M25.562 LEFT KNEE PAIN, UNSPECIFIED CHRONICITY: ICD-10-CM

## 2024-12-20 DIAGNOSIS — M25.562 ACUTE PAIN OF LEFT KNEE: ICD-10-CM

## 2024-12-20 DIAGNOSIS — Z01.89 ENCOUNTER FOR LOWER EXTREMITY COMPARISON IMAGING STUDY: ICD-10-CM

## 2024-12-20 PROCEDURE — 99204 OFFICE O/P NEW MOD 45 MIN: CPT | Performed by: ORTHOPAEDIC SURGERY

## 2024-12-20 PROCEDURE — 73564 X-RAY EXAM KNEE 4 OR MORE: CPT

## 2024-12-20 PROCEDURE — 97161 PT EVAL LOW COMPLEX 20 MIN: CPT | Performed by: PHYSICAL THERAPIST

## 2024-12-20 NOTE — PROGRESS NOTES
REASON FOR VISIT:  Chief Complaint   Patient presents with    Left Knee - Pain     Medial knee pain for about 3 months,        HISTORY OF PRESENT ILLNESS:  LEFT knee pain x 2 months  No acute injury or trauma. Denies any previous L knee pain  Pain located medially, described as aching, inflamed, and sharp pains  Pain worse with activity, stairs or deep knee bending.   Has been staying active, has a stationary bike. Decreased frequency to help minimize pain   Has gone to PT previously for back pain. Tries to stretch and do at home exercises   Meds -  Ibuprofen as needed without relief  Denies instability or mechanical symptoms. No locking or catching.    RIGHT knee pain  Tib/fib fracture and ACL completed on the R side in the late 90s   Has aching at the previous break site  Tries to run/jog on softer surfaces       Past Medical History:   Diagnosis Date    GERD (gastroesophageal reflux disease)     Migraine     Myopic degeneration     PONV (postoperative nausea and vomiting)     Usually is given steroids        Past Surgical History:   Procedure Laterality Date    ANTERIOR CRUCIATE LIGAMENT REPAIR Right      SECTION      DIAGNOSTIC LAPAROSCOPY      X 2    FRACTURE SURGERY Right     Tib/Fib FX    HYSTEROSCOPY      X 5    MN HYSTEROSCOPY BX ENDOMETRIUM&/POLYPC W/WO D&C N/A 9/10/2018    Procedure: HYSTEROSCOPY; LYSIS OF ADHESIONS; ENDOMETRIAL BIOPSY; MYOSURE;  Surgeon: Tara Budinetz, DO;  Location: [facility];  Service: Gynecology       Social History     Socioeconomic History    Marital status: /Civil Union     Spouse name: Not on file    Number of children: Not on file    Years of education: Not on file    Highest education level: Not on file   Occupational History    Not on file   Tobacco Use    Smoking status: Never    Smokeless tobacco: Never   Vaping Use    Vaping status: Never Used   Substance and Sexual Activity    Alcohol use: Yes     Comment: rarely    Drug use: No    Sexual activity: Not on  file   Other Topics Concern    Not on file   Social History Narrative    Not on file     Social Drivers of Health     Financial Resource Strain: Low Risk  (10/29/2023)    [facility], [facility]    Overall Financial Resource Strain (CARDIA)     Difficulty of Paying Living Expenses: Not hard at all   Food Insecurity: No Food Insecurity (10/29/2023)    Received from [facility], [facility]    Hunger Vital Sign     Worried About Running Out of Food in the Last Year: Never true     Ran Out of Food in the Last Year: Never true   Transportation Needs: No Transportation Needs (10/29/2023)    Received from [facility], [facility]    PRAPARE - Transportation     Lack of Transportation (Medical): No     Lack of Transportation (Non-Medical): No   Physical Activity: Not on file   Stress: Stress Concern Present (10/29/2023)    Received from [facility], [facility]    [employer] [place] [facility] [employer] - Occupational Stress Questionnaire     Feeling of Stress : To some extent   Social Connections: Socially Integrated (10/29/2023)    Received from [facility], [facility]    Social Connection and Isolation Panel [NHANES]     Frequency of Communication with Friends and Family: More than three times a week     Frequency of Social Gatherings with Friends and Family: Twice a week     Attends Jain Services: More than 4 times per year     Active Member of Clubs or Organizations: Yes     Attends Club or Organization Meetings: More than 4 times per year     Marital Status:    Intimate Partner Violence: Not At Risk (10/29/2023)    Received from [facility], [facility]    Humiliation, Afraid, Rape, and Kick questionnaire     Fear of Current or Ex-Partner: No     Emotionally Abused: No     Physically  Abused: No     Sexually Abused: No   Housing Stability: Low Risk  (10/29/2023)    Received from [facility], [facility]    Housing Stability Vital Sign     Unable to Pay for Housing in the Last Year: No     Number of Places Lived in the Last Year: 1     Unstable Housing in the Last Year: No       MEDICATIONS:    Current Outpatient Medications:     acetaZOLAMIDE (DIAMOX) 125 mg tablet, Take 125 mg by mouth as needed, Disp: , Rfl:     Calcium Carbonate-Vitamin D (CALCIUM-CARB 600 + D PO), Take 2 tablets by mouth, Disp: , Rfl:     dexlansoprazole (DEXILANT) 60 MG capsule, Take 60 mg by mouth in the morning, Disp: , Rfl:     FERROUS SULFATE ER PO, Take by mouth, Disp: , Rfl:     Galcanezumab-gnlm (Emgality) 120 MG/ML SOAJ, Inject 1 mL under the skin every 30 (thirty) days, Disp: 1 mL, Rfl: 11    ibuprofen (MOTRIN) 200 mg tablet, Take 200 mg by mouth every 6 (six) hours as needed, Disp: , Rfl:     multivitamin (THERAGRAN) TABS, Take 1 tablet by mouth daily, Disp: , Rfl:     omeprazole (PriLOSEC) 40 MG capsule, , Disp: , Rfl:     ondansetron (ZOFRAN) 4 mg tablet, TAKE ONE TABLET BY MOUTH EVERY 8 HOURS AS NEEDED FOR NAUSEA OR VOMITING, Disp: 20 tablet, Rfl: 3    predniSONE 20 mg tablet, 4 tabs on day 1 and decrease by 1/2 tab per day X 8 days, Disp: 18 tablet, Rfl: 0    Ranibizumab (Lucentis) 0.3 MG/0.05ML SOSY, Apply to eye, Disp: , Rfl:     ZOLMitriptan (ZOMIG-ZMT) 5 MG disintegrating tablet, TAKE ONETAB BY MOUTH AT THE ONSET OF MIGRAINE, CAN REPEAT IN 2 HOURS. MAX 10MG/24 HOURS, Disp: 12 tablet, Rfl: 5    budesonide (Pulmicort) 1 MG/2ML nebulizer solution, Take 2 mL (1 mg total) by nebulization 3 (three) times a day Rinse mouth after use., Disp: 60 mL, Rfl: 1    fluticasone (FLONASE) 50 mcg/act nasal spray, 1 spray into each nostril daily, Disp: 11.1 g, Rfl: 0    methylPREDNISolone 4 MG tablet therapy pack, Use as directed on package, Disp: 21 tablet, Rfl: 0    methylPREDNISolone 4 MG  tablet therapy pack, Use as directed on package, Disp: 1 each, Rfl: 0    ZOLMitriptan (ZOMIG) 5 MG tablet, TAKE ONETAB BY MOUTH AT THE ONSET OF MIGRAINE, CAN REPEAT IN 2 HOURS. MAX 10MG/24 HOURS, Disp: 12 tablet, Rfl: 5    ALLERGIES:  Allergies   Allergen Reactions    Morphine Itching    Ultram [Tramadol]      Hypotensive      Adhesive [Medical Tape] Rash    Neomycin Rash       MAJOR FINDINGS:  Jackie Hdz is pleasant, healthy appearing, and in no acute distress.     LEFT knee  Skin intact, ROM 5-0-130   No effusion  Normal patella tracking   No patella apprehension  Joint line tenderness: moderate medial pain, Tera test: negative  Anterior drawer: negative, Lachman: stable, Posterior drawer: negative   Stable to varus/valgus  Sensation intact sp/dp/t  Strength intact 5/5 dorsiflexion/plantarflexion/SLR, Extremity wwp  No calf pain      RIGHT knee  Skin intact, ROM 5-0-130   No effusion  Normal patella tracking   No patella apprehension  Joint line tenderness: none, Tera test: negative  Tender at the distal portion of tibia, near previous fracture   Anterior drawer: negative, Lachman: stable, Posterior drawer: negative   Stable to varus/valgus  Sensation intact sp/dp/t  Strength intact 5/5 dorsiflexion/plantarflexion/SLR, Extremity wwp  Previous scars secondary to ACL recon and tib/fib fixation  No calf pain    IMAGING  I personally reviewed and interpreted the imaging studies  X-rays performed on the LEFT knee show no signs of significant arthritis with preserved joint space  RIGHT side shows prior ACL reconstruction with metal interference screws as well as old intramedullary juan      ASSESSMENT:  LEFT knee medial pain, possible meniscus tear/cartilage wear    PLAN:  LEFT knee  Discussed treatment options  Possibility of medial meniscus tear   Home exercise program can be accessed here:   [email or web address]    Is going to see PT later today for her low back  pain, will ask about LEFT knee exercise program, new script provided   Can continue low impact exercises, such as swimming, stationary bike, walking    Continue OTC pain meds, can try to apply Voltaren gel to the area   Can consider injections or MRI in the future if any persistent pain    RIGHT knee  Has an increased risk of arthritis secondary to ACL surgery  Can be seen again in the office if pain continues for further imagine of tib/fib if needed    CC:  Pepe Oconnell, DO Self, Referral

## 2024-12-20 NOTE — LETTER
2024    No Recipients    Patient: Jackie Hdz   YOB: 1980   Date of Visit: 2024     Encounter Diagnosis     ICD-10-CM    1. Lumbar spine pain  M54.50       2. Acute pain of left knee  M25.562           Dear Dr. Oconnell:    Thank you for your recent referral of Jackie Hdz. Please review the attached evaluation summary from Jackie's recent visit.     Please verify that you agree with the plan of care by signing the attached order.     If you have any questions or concerns, please do not hesitate to call.     I sincerely appreciate the opportunity to share in the care of one of your patients and hope to have another opportunity to work with you in the near future.       Sincerely,    Brandon Dueñas, PT      Referring Provider:      I certify that I have read the below Plan of Care and certify the need for these services furnished under this plan of treatment while under my care.                    Pepe Oconnell DO  [address]  [address]  [place] [address]  Via Fax: [phone]          PT Evaluation     Today's date: 2024  Patient name: Jackie Hdz  : 1980  MRN: [id number]  Referring provider: Pepe Oconnell DO  Dx:   Encounter Diagnosis     ICD-10-CM    1. Lumbar spine pain  M54.50       2. Acute pain of left knee  M25.562                      Assessment    Assessment details: Pt is a 44 y.o. female who presents to outpatient PT with pain, decreased rom, decreased strength and decreased functional mobility. She will benefit from skilled PT to address these deficits in order to achieve her goals and maximize her functional mobility.           Goals  Short Term Goals:   Independent performance of initial hep  Decrease pain 2 points on VAS      Long Term Goals:  Independent performance of comprehensive hep  Work performance is returned to max level of function  Performance of IADL's is returned to max level of function  Performance in  recreational activities is improved to max level of function  Able to walk community distance with min pain  Reduce morning pain <3/10     Plan    Planned therapy interventions: abdominal trunk stabilization, joint mobilization, kinesiology taping, manual therapy, massage, strengthening, stretching, therapeutic activities, therapeutic exercise, home exercise program and IADL retraining    Frequency: 2x week  Duration in weeks: 6        Subjective Evaluation    History of Present Illness  Mechanism of injury: Pt reports chronic spine pain for years but has been getting worse recently. Denies radicular symptoms but will notice radiation into her R glute.  Reports pain with prolonged standing when barefoot, increased pain when performing sustained flex activities such as washing dishes.  Reports that she has done PT in the past with good pain reduction.  Reports that she has most pain first thing in the morning.  Reports reduction when she performs trunk rotation and glutes stretching.  Reports pain reduction with ice/ibuprofen.    Repots that she would like to return to more core strengthening exercises.        Reports she has also been experiencing L knee pain. X-ray neg for bony pathology. Her physician is concerned for possible L medial meniscal issue or cartilage defect. No locking or giving out. Reports that she rides her pelaton bike with min pain but has pain when she externally rotates with L hip.  Will have pain with repeated deep knee bends, lunges or when sitting with her legs crossed.   Patient Goals  Patient goals for therapy: decreased pain, increased motion, increased strength, independence with ADLs/IADLs, return to sport/leisure activities and return to work    Pain  Current pain ratin  At best pain ratin  At worst pain rating: 10          Objective  Lumbar spine:    ROM:   Flexion: min limited   Extension: mod limited, pain produced   Right Rotation: min limited, slight pain at end  range   Left Rotation: wfl   Right Lateral Flexion: min limited, stiff   Left Lateral Flexion:  min limited, stiff      TTP over R>L SIJ and piriformis  Poor control of abdominals noted with TaA  Hypomobility noted with spring testing: t/o lumbar spine pain produced L4-L5 and has hypomobility t/o thoracic spine  Rounded shoulder posture and increased thoracic kyphosis noted with sitting.    Straight Leg Raise: neg  Cross SLR:  neg  Slump Test:  neg  Prone Knee Bend: neg   Directional testing: increased back pain with repeated extension  SIJ cluster: Pos for R Anterior inominate  Alexandra's sign neg  Prone instability test: slight pos         L knee      ROM   Flexion: wfl   Extension: wfl    Strength:   Flexion:  4+/5   Extension:  4+/5    Pain with:  TTP over anterior/medial jnt line and MCL  Lachman's: neg  Posterior Drawer: neg  Valgus Stress Test: neg for instability but pain produced  Varus Stress Test: neg  Single Leg Squat: pain produced                 Precautions: chronic back pain      Manuals             SIJ MET             Thoracic pa's             L4/L5 pa's             Epat L  knee anterior/medial nt line             Neuro Re-Ed             TaA             TaA bridge             TaA clamshells             TaA LPD                                                    Ther Ex             Thoracic extension stretch             Thread the needle             Open book stretch             Piriformis stretch                                                    bike             Ther Activity                                       Gait Training                                       Modalities

## 2024-12-20 NOTE — PROGRESS NOTES
PT Evaluation     Today's date: 2024  Patient name: Jackie Hdz  : 1980  MRN: [id number]  Referring provider: Pepe Oconnell DO  Dx:   Encounter Diagnosis     ICD-10-CM    1. Lumbar spine pain  M54.50       2. Acute pain of left knee  M25.562                      Assessment    Assessment details: Pt is a 44 y.o. female who presents to outpatient PT with pain, decreased rom, decreased strength and decreased functional mobility. She will benefit from skilled PT to address these deficits in order to achieve her goals and maximize her functional mobility.           Goals  Short Term Goals:   Independent performance of initial hep  Decrease pain 2 points on VAS      Long Term Goals:  Independent performance of comprehensive hep  Work performance is returned to max level of function  Performance of IADL's is returned to max level of function  Performance in recreational activities is improved to max level of function  Able to walk community distance with min pain  Reduce morning pain <3/10     Plan    Planned therapy interventions: abdominal trunk stabilization, joint mobilization, kinesiology taping, manual therapy, massage, strengthening, stretching, therapeutic activities, therapeutic exercise, home exercise program and IADL retraining    Frequency: 2x week  Duration in weeks: 6        Subjective Evaluation    History of Present Illness  Mechanism of injury: Pt reports chronic spine pain for years but has been getting worse recently. Denies radicular symptoms but will notice radiation into her R glute.  Reports pain with prolonged standing when barefoot, increased pain when performing sustained flex activities such as washing dishes.  Reports that she has done PT in the past with good pain reduction.  Reports that she has most pain first thing in the morning.  Reports reduction when she performs trunk rotation and glutes stretching.  Reports pain reduction with ice/ibuprofen.    Repots that she would  like to return to more core strengthening exercises.        Reports she has also been experiencing L knee pain. X-ray neg for bony pathology. Her physician is concerned for possible L medial meniscal issue or cartilage defect. No locking or giving out. Reports that she rides her pelaton bike with min pain but has pain when she externally rotates with L hip.  Will have pain with repeated deep knee bends, lunges or when sitting with her legs crossed.   Patient Goals  Patient goals for therapy: decreased pain, increased motion, increased strength, independence with ADLs/IADLs, return to sport/leisure activities and return to work    Pain  Current pain ratin  At best pain ratin  At worst pain rating: 10          Objective  Lumbar spine:    ROM:   Flexion: min limited   Extension: mod limited, pain produced   Right Rotation: min limited, slight pain at end range   Left Rotation: wfl   Right Lateral Flexion: min limited, stiff   Left Lateral Flexion:  min limited, stiff      TTP over R>L SIJ and piriformis  Poor control of abdominals noted with TaA  Hypomobility noted with spring testing: t/o lumbar spine pain produced L4-L5 and has hypomobility t/o thoracic spine  Rounded shoulder posture and increased thoracic kyphosis noted with sitting.    Straight Leg Raise: neg  Cross SLR:  neg  Slump Test:  neg  Prone Knee Bend: neg   Directional testing: increased back pain with repeated extension  SIJ cluster: Pos for R Anterior inominate  Vaiden's sign neg  Prone instability test: slight pos         L knee      ROM   Flexion: wfl   Extension: wfl    Strength:   Flexion:  4+/5   Extension:  4+/5    Pain with:  TTP over anterior/medial jnt line and MCL  Lachman's: neg  Posterior Drawer: neg  Valgus Stress Test: neg for instability but pain produced  Varus Stress Test: neg  Single Leg Squat: pain produced                 Precautions: chronic back pain      Manuals             SIJ MET             Thoracic pa's              L4/L5 pa's             Epat L  knee anterior/medial nt line             Neuro Re-Ed             TaA             TaA bridge             TaA clamshells             TaA LPD                                                    Ther Ex             Thoracic extension stretch             Thread the needle             Open book stretch             Piriformis stretch                                                    bike             Ther Activity                                       Gait Training                                       Modalities

## 2025-01-03 ENCOUNTER — OFFICE VISIT (OUTPATIENT)
Dept: PHYSICAL THERAPY | Facility: CLINIC | Age: 45
End: 2025-01-03
Payer: COMMERCIAL

## 2025-01-03 DIAGNOSIS — M54.50 LUMBAR SPINE PAIN: Primary | ICD-10-CM

## 2025-01-03 DIAGNOSIS — M25.562 LEFT KNEE PAIN, UNSPECIFIED CHRONICITY: ICD-10-CM

## 2025-01-03 PROCEDURE — 97112 NEUROMUSCULAR REEDUCATION: CPT

## 2025-01-03 PROCEDURE — 97110 THERAPEUTIC EXERCISES: CPT

## 2025-01-03 NOTE — PROGRESS NOTES
"Daily Note     Today's date: 1/3/2025  Patient name: Jackie Hdz  : 1980  MRN: [id number]  Referring provider: Pepe Oconnell DO  Dx:   Encounter Diagnosis     ICD-10-CM    1. Lumbar spine pain  M54.50       2. Left knee pain, unspecified chronicity  M25.562           Start Time: 1349  Stop Time: 1436  Total time in clinic (min): 47 minutes      Subjective: Patient reports she'd been skiing in [place].  Patient reports her back is most painful in the morning and reports she must stretch in order to get out of bed.  Patient rates back pain 4/10 mostly across her thoracic spine.   Patient reports her left knee is feeling a little better.      Objective: See treatment diary below.      Assessment: Patient has appropriate pain response to EPAT treatment.  Thoracic spine mobility is limited.      Plan: Continue treatment as per PT plan of care.       Precautions: chronic back pain      Manuals 1/3            SIJ MET             L4/L5 pa's             Thoracic pa's KL            EPAT L knee anterior/medial joint line DI15  15 Hz  1.2 bar  3,000 pulses  JLW                                                   Neuro Re-Ed             TaA             TaA bridge w/ ball squeeze  5\"x20            TaA clamshells blue  5\"x20 ea            TaA LPD black  3x10                                                   Ther Ex             Thoracic extension stretch supine w/ foam roll  10\"x10              Thread the needle             Open book stretch 15\"x4            Piriformis stretch fig 4  30\"x3 ea            bike 6'                                                                Ther Activity                                       Gait Training                                       Modalities                                            "

## 2025-01-08 ENCOUNTER — OFFICE VISIT (OUTPATIENT)
Dept: PHYSICAL THERAPY | Facility: CLINIC | Age: 45
End: 2025-01-08
Payer: COMMERCIAL

## 2025-01-08 DIAGNOSIS — M25.562 LEFT KNEE PAIN, UNSPECIFIED CHRONICITY: ICD-10-CM

## 2025-01-08 DIAGNOSIS — M54.50 LUMBAR SPINE PAIN: Primary | ICD-10-CM

## 2025-01-08 PROCEDURE — 97112 NEUROMUSCULAR REEDUCATION: CPT | Performed by: PHYSICAL THERAPIST

## 2025-01-08 PROCEDURE — 97110 THERAPEUTIC EXERCISES: CPT | Performed by: PHYSICAL THERAPIST

## 2025-01-08 PROCEDURE — 97140 MANUAL THERAPY 1/> REGIONS: CPT | Performed by: PHYSICAL THERAPIST

## 2025-01-08 NOTE — PROGRESS NOTES
"Daily Note     Today's date: 2025  Patient name: Jackie Hdz  : 1980  MRN: [id number]  Referring provider: Pepe Oconnell DO  Dx:   Encounter Diagnosis     ICD-10-CM    1. Lumbar spine pain  M54.50       2. Left knee pain, unspecified chronicity  M25.562                        Subjective: Pt L knee aggravation after EPAT LV, reports this is reduces but has not reach baseline yet. Reports min back pain upon arrival.      Objective: See treatment diary below.      Assessment: modified therex as listed with reprots of mm fatigue but no replication of back or knee pain. Monitor response and progress as janet Nv. Plan to resume EPAT NV but may require decreased intensity.     Plan: Continue treatment as per PT plan of care.       Precautions: chronic back pain      Manuals 1/3 1/8           SIJ MET             L4/L5 pa's             Thoracic pa's KL kl           EPAT L knee anterior/medial joint line DI15  15 Hz  1.2 bar  3,000 pulses  JLW            laser  4'           L knee pa's, ap's  kl                        Neuro Re-Ed             TaA             TaA bridge w/ ball squeeze  5\"x20 Single leg x20           TaA clamshells blue  5\"x20 ea            TaA LPD black  3x10            Scap retraction with ER  Green tb 5\"x20                                     Ther Ex             Thoracic extension stretch supine w/ foam roll  10\"x10              Thread the needle  15\"x4           Open book stretch 15\"x4            Piriformis stretch fig 4  30\"x3 ea            bike 6' 8'           Hip hike  5\"2x10           Curtsy lunge  x10                                     Ther Activity                                       Gait Training                                       Modalities                                            "

## 2025-01-10 ENCOUNTER — OFFICE VISIT (OUTPATIENT)
Dept: PHYSICAL THERAPY | Facility: CLINIC | Age: 45
End: 2025-01-10
Payer: COMMERCIAL

## 2025-01-10 DIAGNOSIS — M54.50 LUMBAR SPINE PAIN: Primary | ICD-10-CM

## 2025-01-10 PROCEDURE — 97140 MANUAL THERAPY 1/> REGIONS: CPT | Performed by: PHYSICAL THERAPIST

## 2025-01-10 PROCEDURE — 97112 NEUROMUSCULAR REEDUCATION: CPT | Performed by: PHYSICAL THERAPIST

## 2025-01-10 PROCEDURE — 97110 THERAPEUTIC EXERCISES: CPT | Performed by: PHYSICAL THERAPIST

## 2025-01-10 NOTE — PROGRESS NOTES
"Daily Note     Today's date: 1/10/2025  Patient name: Jackie Hdz  : 1980  MRN: [id number]  Referring provider: Pepe Oconnell DO  Dx:   No diagnosis found.                   Subjective: Pt L knee aggravation after EPAT LV, reports this is reduces but has not reach baseline yet. Reports min back pain upon arrival.      Objective: See treatment diary below.      Assessment: added therex as listed with no complaint. Pt remains tender along anterior/medial jnt line. She is sore t/o graston this is tolerable. Pt initially has back pain with bridge on bosu but this is improved with instruction to limited hip extension rom. Plan to resume EPAT NV but may require decreased intensity.     Plan: Continue treatment as per PT plan of care.       Precautions: chronic back pain      Manuals 1/3 1/8 1/10          SIJ MET             L4/L5 pa's             Thoracic pa's KL kl           EPAT L knee anterior/medial joint line DI15  15 Hz  1.2 bar  3,000 pulses  JLW  nv          laser  4'           L knee pa's, ap's  kl kl          Graston   Anterior/medial knee kl          Neuro Re-Ed             TaA             TaA bridge w/ ball squeeze  5\"x20 Single leg x20 On bosu x10          TaA clamshells blue  5\"x20 ea  Sidelyinng lvl 2 3x10ea          TaA LPD black  3x10            Scap retraction with ER  Green tb 5\"x20                                     Ther Ex             Thoracic extension stretch supine w/ foam roll  10\"x10    10\"x10          Thread the needle  15\"x4           Open book stretch 15\"x4  15\"x4          Piriformis stretch fig 4  30\"x3 ea            bike 6' 8' 6'          Hip hike  5\"2x10           Curtsy lunge  x10           Wall slides with lift off   Green tb 5\"x10                       Ther Activity                                       Gait Training                                       Modalities                                            "

## 2025-01-13 ENCOUNTER — OFFICE VISIT (OUTPATIENT)
Dept: PHYSICAL THERAPY | Facility: CLINIC | Age: 45
End: 2025-01-13
Payer: COMMERCIAL

## 2025-01-13 DIAGNOSIS — M25.562 ACUTE PAIN OF LEFT KNEE: ICD-10-CM

## 2025-01-13 DIAGNOSIS — M54.50 LUMBAR SPINE PAIN: Primary | ICD-10-CM

## 2025-01-13 DIAGNOSIS — M25.562 LEFT KNEE PAIN, UNSPECIFIED CHRONICITY: ICD-10-CM

## 2025-01-13 PROCEDURE — 97140 MANUAL THERAPY 1/> REGIONS: CPT | Performed by: PHYSICAL THERAPIST

## 2025-01-13 PROCEDURE — 97112 NEUROMUSCULAR REEDUCATION: CPT | Performed by: PHYSICAL THERAPIST

## 2025-01-13 PROCEDURE — 97110 THERAPEUTIC EXERCISES: CPT | Performed by: PHYSICAL THERAPIST

## 2025-01-13 NOTE — PROGRESS NOTES
"Daily Note     Today's date: 2025  Patient name: Jackie Hdz  : 1980  MRN: [id number]  Referring provider: Pepe Oconnell DO  Dx:   Encounter Diagnosis     ICD-10-CM    1. Lumbar spine pain  M54.50       2. Left knee pain, unspecified chronicity  M25.562       3. Acute pain of left knee  M25.562                          Subjective: Pt reports slight knee pain upon arrival to PT today    Objective: See treatment diary below.      Assessment: progressed therex as listed. Reports slight knee discomfort during monster walks. Monitor response NV    Plan: Continue treatment as per PT plan of care.       Precautions: chronic back pain      Manuals 1/3 1/8 1/10 1/13         SIJ MET             L4/L5 pa's             Thoracic pa's KL kl           EPAT L knee anterior/medial joint line DI15  15 Hz  1.2 bar  3,000 pulses  JLW  nv          laser  4'  4'         L knee pa's, ap's  kl kl kl         Graston   Anterior/medial knee kl          Neuro Re-Ed             TaA             TaA bridge w/ ball squeeze  5\"x20 Single leg x20 On bosu x10 x20         TaA clamshells blue  5\"x20 ea  Sidelyinng lvl 2 3x10ea          TaA LPD black  3x10            Scap retraction with ER  Green tb 5\"x20                                     Ther Ex             Thoracic extension stretch supine w/ foam roll  10\"x10    10\"x10 10\"x10         Thread the needle  15\"x4           Open book stretch 15\"x4  15\"x4 15\"x4         Piriformis stretch fig 4  30\"x3 ea            bike 6' 8' 6' 8'         Hip hike  5\"2x10  5# KB 2x10ea         Curtsy lunge  x10           Wall slides with lift off   Green tb 5\"x10 Green 5\"x20         Monster walks    Green tb 12ftx6         Ther Activity                                       Gait Training                                       Modalities                                            "

## 2025-01-15 ENCOUNTER — OFFICE VISIT (OUTPATIENT)
Dept: PHYSICAL THERAPY | Facility: CLINIC | Age: 45
End: 2025-01-15
Payer: COMMERCIAL

## 2025-01-15 DIAGNOSIS — M54.50 LUMBAR SPINE PAIN: Primary | ICD-10-CM

## 2025-01-15 PROCEDURE — 97110 THERAPEUTIC EXERCISES: CPT | Performed by: PHYSICAL THERAPIST

## 2025-01-15 PROCEDURE — 97140 MANUAL THERAPY 1/> REGIONS: CPT | Performed by: PHYSICAL THERAPIST

## 2025-01-15 NOTE — PROGRESS NOTES
"Daily Note     Today's date: 1/15/2025  Patient name: Jackie Hdz  : 1980  MRN: [id number]  Referring provider: Pepe Oconnell DO  Dx:   Encounter Diagnosis     ICD-10-CM    1. Lumbar spine pain  M54.50                          Subjective: Pt reports that her back and her knee are feeling \"pretty good\" today    Objective: See treatment diary below.      Assessment: added therex as listed. Had to reduce weight of med ball during split squats and pt is able to perform comfortably. Pt fatigues with addition stabilization program but denies any increased pain. Provided pt with an updated hep. Monitor response and continues to progress as janet Nv.      Access Code: [email or web address]  URL: [email or web address]  Date: 01/15/2025  Prepared by: Brandon Dueñas    Exercises  - Supine 90/90 Alternating Toe Touch  - 1 x daily - 7 x weekly - 3 sets - 10 reps  - Dead Bug with Swiss Ball  - 1 x daily - 7 x weekly - 10 reps - 5 seconds hold  - Lunge with Rotation and Medicine Ball  - 1 x daily - 7 x weekly - 10 reps    Plan: Continue treatment as per PT plan of care.       Precautions: chronic back pain      Manuals 1/3 1/8 1/10 1/13 1/15        SIJ MET             L4/L5 pa's             Thoracic pa's KL kl           EPAT L knee anterior/medial joint line DI15  15 Hz  1.2 bar  3,000 pulses  JLW  nv          laser  4'  4' 4'        L knee pa's, ap's  kl kl kl 4'        Graston   Anterior/medial knee kl          Neuro Re-Ed             TaA             TaA bridge w/ ball squeeze  5\"x20 Single leg x20 On bosu x10 x20         TaA clamshells blue  5\"x20 ea  Sidelyinng lvl 2 3x10ea          TaA LPD black  3x10            Scap retraction with ER  Green tb 5\"x20                                     Ther Ex             Thoracic extension stretch supine w/ foam roll  10\"x10    10\"x10 10\"x10 10\"x10        Thread the needle  15\"x4           Open book stretch 15\"x4  15\"x4 15\"x4         Piriformis stretch fig 4  30\"x3 ea          " "  bike 6' 8' 6' 8' 8'        Hip hike  5\"2x10  5# KB 2x10ea         Curtsy lunge  x10           Wall slides with lift off   Green tb 5\"x10 Green 5\"x20 Blue tb x20        Monster walks    Green tb 12ftx6         TaA heel taps     3x10        Dead bud isometric     x20        Split squat with med ball rotation     Y med ball x10ea        Ther Activity                                       Gait Training                                       Modalities                                            "

## 2025-01-20 ENCOUNTER — OFFICE VISIT (OUTPATIENT)
Dept: PHYSICAL THERAPY | Facility: CLINIC | Age: 45
End: 2025-01-20
Payer: COMMERCIAL

## 2025-01-20 DIAGNOSIS — M25.562 LEFT KNEE PAIN, UNSPECIFIED CHRONICITY: ICD-10-CM

## 2025-01-20 DIAGNOSIS — M54.50 LUMBAR SPINE PAIN: Primary | ICD-10-CM

## 2025-01-20 PROCEDURE — 97110 THERAPEUTIC EXERCISES: CPT

## 2025-01-20 NOTE — PROGRESS NOTES
"Daily Note     Today's date: 2025  Patient name: Jackie Hdz  : 1980  MRN: [id number]  Referring provider: Pepe Oconnell DO  Dx:   Encounter Diagnosis     ICD-10-CM    1. Lumbar spine pain  M54.50       2. Left knee pain, unspecified chronicity  M25.562           Start Time: 1623  Stop Time: 1703  Total time in clinic (min): 40 minutes      Subjective: Patient reports everything was fine after progressing strengthening exercises last visit.  Patient reports her left knee feels achy at times.      Objective: See treatment diary below.      Assessment: B/L lower extremity fatigue noted when performing clamshells, split squats, and cross over step ups.  Patient demonstrates normal left knee ROM.      Plan: Continue treatment as per PT plan of care.       Precautions: chronic back pain      Manuals 1/3 1/8 1/10 1/13 1/15 1/20       SIJ MET             L4/L5 pa's             Thoracic pa's KL kl           EPAT L knee anterior/medial joint line DI15  15 Hz  1.2 bar  3,000 pulses  JLW  nv          laser  4'  4' 4' 4'       L knee pa's, ap's  kl kl kl 4' patella ROM  JLW       Graston   Anterior/medial knee kl                                    Neuro Re-Ed             TaA             TaA bridge w/ ball squeeze  5\"x20 Single leg x20 On bosu x10 x20         TaA clamshells blue  5\"x20 ea  Sidelyinng lvl 2 3x10ea   blue  5\"x20 ea       TaA LPD black  3x10     black  5\"x20       Scap retraction with ER  Green tb 5\"x20                                     Ther Ex             Thoracic extension stretch supine w/ foam roll  10\"x10    10\"x10 10\"x10 10\"x10        Thread the needle  15\"x4           Open book stretch 15\"x4  15\"x4 15\"x4         Piriformis stretch fig 4  30\"x3 ea            bike 6' 8' 6' 8' 8' 8'       Hip hike  5\"2x10  5# KB 2x10ea         Curtsy lunge  x10           Wall slides with lift off   Green tb 5\"x10 Green 5\"x20 Blue tb x20 blue  20       Monster walks    Green tb 12ftx6         TaA heel taps   "   3x10        Dead bud isometric     x20        Split squat with med ball rotation     Y med ball x10ea  Y med ball   15 ea       Cross over step up       bosu   15 ea                                              Ther Activity                                       Gait Training                                       Modalities

## 2025-01-24 ENCOUNTER — APPOINTMENT (OUTPATIENT)
Dept: PHYSICAL THERAPY | Facility: CLINIC | Age: 45
End: 2025-01-24
Payer: COMMERCIAL

## 2025-01-27 ENCOUNTER — APPOINTMENT (OUTPATIENT)
Dept: PHYSICAL THERAPY | Facility: CLINIC | Age: 45
End: 2025-01-27
Payer: COMMERCIAL

## 2025-01-31 ENCOUNTER — APPOINTMENT (OUTPATIENT)
Dept: PHYSICAL THERAPY | Facility: CLINIC | Age: 45
End: 2025-01-31
Payer: COMMERCIAL

## 2025-02-01 ENCOUNTER — TELEMEDICINE (OUTPATIENT)
Dept: OTHER | Facility: HOSPITAL | Age: 45
End: 2025-02-01
Payer: COMMERCIAL

## 2025-02-01 DIAGNOSIS — J11.1 FLU: ICD-10-CM

## 2025-02-01 DIAGNOSIS — J04.0 LARYNGITIS: Primary | ICD-10-CM

## 2025-02-01 PROCEDURE — 99213 OFFICE O/P EST LOW 20 MIN: CPT | Performed by: PHYSICIAN ASSISTANT

## 2025-02-01 RX ORDER — FLUTICASONE PROPIONATE 50 MCG
1 SPRAY, SUSPENSION (ML) NASAL DAILY
Qty: 16 ML | Refills: 0 | Status: SHIPPED | OUTPATIENT
Start: 2025-02-01

## 2025-02-01 RX ORDER — PREDNISONE 10 MG/1
TABLET ORAL
Qty: 21 TABLET | Refills: 0 | Status: SHIPPED | OUTPATIENT
Start: 2025-02-01

## 2025-02-01 RX ORDER — AZITHROMYCIN 250 MG/1
TABLET, FILM COATED ORAL
Qty: 6 TABLET | Refills: 0 | Status: SHIPPED | OUTPATIENT
Start: 2025-02-01 | End: 2025-02-05

## 2025-02-01 RX ORDER — BUDESONIDE 1 MG/2ML
1 INHALANT ORAL DAILY
Qty: 60 ML | Refills: 0 | Status: SHIPPED | OUTPATIENT
Start: 2025-02-01

## 2025-02-01 NOTE — PROGRESS NOTES
Virtual Regular Visit  Name: Jackie Hdz      : 1980      MRN: [id number]  Encounter Provider: Your Video Visit Provider  Encounter Date: 2025   Encounter department: VIRTUAL CARE       Verification of patient location:  Patient is located at Home in the following state in which I hold an active license [place] :  Assessment & Plan  Laryngitis    Orders:    predniSONE 10 mg tablet; 6ctrem0tz,2ciszu4irf,9wjqpy3rhx,5zhorm9mpf,9puxkd9ebc,1xuap7cyt    budesonide (Pulmicort) 1 MG/2ML nebulizer solution; Take 2 mL (1 mg total) by nebulization daily Rinse mouth after use.    Flu    Orders:    predniSONE 10 mg tablet; 9rffeq0uh,4qntqz1dbd,1qsfjr8psv,0yzkwi7xpd,0wwobg0snv,0hgss9zjs    fluticasone (FLONASE) 50 mcg/act nasal spray; 1 spray into each nostril daily    azithromycin (ZITHROMAX) 250 mg tablet; Take 2 tablets today then 1 tablet daily x 4 days      Due to history in past of having some mid subglottis inflammation and laryngitis with illness and that patient has still a sore throat and hoarse voice, will give prednisone, flonase and continue her nebulizers per ENT instructions in the past.  She can continue over the counter meds.  Due to illness being on day 8 will give azithromycin for secondary infection if no improvement in a few days or if worsens.  Follow up with pcp. Er if worsen  Encounter provider Your Video Visit Provider    The patient was identified by name and date of birth. Jackie Hdz was informed that this is a telemedicine visit and that the visit is being conducted through the Epic Embedded platform. She agrees to proceed..  My office door was closed. No one else was in the room.  She acknowledged consent and understanding of privacy and security of the video platform. The patient has agreed to participate and understands they can discontinue the visit at any time.    Patient is aware this is a billable service.     History of Present Illness     Patient states that she has had the  flu since Saturday 1/25. She always ends up with a lot of mucus and voice loss and cough post viral.  She ended up needed nebulizer.  She is having horrible coughing and throat pain. She doesn't think she wheezing, fever, or secondary infection.  It started with fatigue, bodyaches, and low grade fever.  The night time symptoms ramped up the past few days. She is up every two hours to deal with mucus. She is taking tylenol and ibuprofen., mucinex, antihistamines.  Inhaled steroid and saline neb.  She tried albuterol inhalers which hasn't help.  She added Claritin last night.   She does have cough meds with codiene.       Review of Systems   Constitutional:  Positive for fatigue.   HENT:  Positive for congestion, sore throat and voice change.    Respiratory:  Positive for cough.    Musculoskeletal:  Positive for arthralgias.   Neurological:  Positive for headaches.       Objective   There were no vitals taken for this visit.    Physical Exam  Constitutional:       General: She is not in acute distress.     Appearance: Normal appearance. She is not ill-appearing, toxic-appearing or diaphoretic.   HENT:      Head: Normocephalic and atraumatic.   Pulmonary:      Effort: Pulmonary effort is normal. No respiratory distress.      Comments: Voice hoarse.  Skin:     General: Skin is dry.   Neurological:      General: No focal deficit present.      Mental Status: She is alert and oriented to person, place, and time.   Psychiatric:         Mood and Affect: Mood normal.         Behavior: Behavior normal.         Visit Time  Total Visit Duration: 10

## 2025-02-01 NOTE — PATIENT INSTRUCTIONS
Start prednisone as directed  Flonase as directed  Continue nebs as directed by ENT  Start azithromycin if no improvement or worsen  Continue with over the counter medications   Follow up with PCP  ER if worsen

## 2025-02-01 NOTE — LETTER
February 1, 2025     Patient: Jackie Hdz  YOB: 1980  Date of Visit: 2/1/2025      To Whom it May Concern:    Jackie Hdz is under my professional care. Jackie was seen in my office on 2/1/2025. Please excuse her from work on 1/27-1/31  Jackie may return to work on 2/3/25 as long as fever free for 24 hrs and improving.  .    If you have any questions or concerns, please don't hesitate to call.         Sincerely,          Barbara He PA-C        CC: No Recipients

## 2025-02-03 ENCOUNTER — OFFICE VISIT (OUTPATIENT)
Dept: PHYSICAL THERAPY | Facility: CLINIC | Age: 45
End: 2025-02-03
Payer: COMMERCIAL

## 2025-02-03 DIAGNOSIS — M25.562 LEFT KNEE PAIN, UNSPECIFIED CHRONICITY: ICD-10-CM

## 2025-02-03 DIAGNOSIS — M54.50 LUMBAR SPINE PAIN: Primary | ICD-10-CM

## 2025-02-03 PROCEDURE — 97110 THERAPEUTIC EXERCISES: CPT

## 2025-02-03 NOTE — PROGRESS NOTES
"Daily Note     Today's date: 2/3/2025  Patient name: Jackie Hdz  : 1980  MRN: [id number]  Referring provider: Pepe Oconnell DO  Dx:   Encounter Diagnosis     ICD-10-CM    1. Lumbar spine pain  M54.50       2. Left knee pain, unspecified chronicity  M25.562           Start Time: 0938  Stop Time: 1018  Total time in clinic (min): 40 minutes      Subjective: Patient reports she's been sick.  Patient reports she had shoulder pain following the last PT session.      Objective: See treatment diary below.      Assessment: Progression of TE program is tolerated well.  Patient demonstrates normal left knee ROM.       Plan: Continue treatment as per PT plan of care.       Precautions: chronic back pain      Manuals 1/3 1/8 1/10 1/13 1/15 1/20 2/3      SIJ MET             L4/L5 pa's             Thoracic pa's KL kl     KL      EPAT L knee anterior/medial joint line DI15  15 Hz  1.2 bar  3,000 pulses  JLW  nv          laser  4'  4' 4' 4' 4'  JLW      L knee pa's, ap's  kl kl kl 4' patella ROM  JLW patella ROM  JLW      Graston   Anterior/medial knee kl                                    Neuro Re-Ed             TaA             TaA bridge w/ ball squeeze  5\"x20 Single leg x20 On bosu x10 x20         TaA clamshells blue  5\"x20 ea  Sidelyinng lvl 2 3x10ea   blue  5\"x20 ea blue  5\"x20 ea      TaA LPD black  3x10     black  5\"x20 bradford  13.0  3x10      Scap retraction with ER  Green tb 5\"x20                                     Ther Ex             Thoracic extension stretch supine w/ foam roll  10\"x10    10\"x10 10\"x10 10\"x10  supine w/ foam roll  10\"x10      Thread the needle  15\"x4           Open book stretch 15\"x4  15\"x4 15\"x4         Piriformis stretch fig 4  30\"x3 ea            bike 6' 8' 6' 8' 8' 8'       Hip hike  5\"2x10  5# KB 2x10ea         Curtsy lunge  x10           Wall slides with lift off   Green tb 5\"x10 Green 5\"x20 Blue tb x20 blue  20       Monster walks    Green tb 12ftx6         TaA heel taps     3x10  "       Dead bud isometric     x20        Split squat with med ball rotation     Y med ball x10ea  Y med ball   15 ea       Cross over step up       bosu   15 ea  bosu   20 ea      Leg press        single leg    60#   3x10 ea                                Ther Activity                                       Gait Training                                       Modalities

## 2025-02-07 ENCOUNTER — OFFICE VISIT (OUTPATIENT)
Dept: PHYSICAL THERAPY | Facility: CLINIC | Age: 45
End: 2025-02-07
Payer: COMMERCIAL

## 2025-02-07 DIAGNOSIS — M25.562 LEFT KNEE PAIN, UNSPECIFIED CHRONICITY: ICD-10-CM

## 2025-02-07 DIAGNOSIS — M54.50 LUMBAR SPINE PAIN: Primary | ICD-10-CM

## 2025-02-07 PROCEDURE — 97110 THERAPEUTIC EXERCISES: CPT

## 2025-02-07 NOTE — PROGRESS NOTES
"Daily Note     Today's date: 2025  Patient name: aJckie Hdz  : 1980  MRN: [id number]  Referring provider: Pepe Oconnell DO  Dx:   Encounter Diagnosis     ICD-10-CM    1. Lumbar spine pain  M54.50       2. Left knee pain, unspecified chronicity  M25.562           Start Time: 0948  Stop Time: 1029  Total time in clinic (min): 41 minutes      Subjective: Patient reports her left knee has been feeling better.  While recovering from recent illness, patient reports she is still getting fatigued more quickly when exercising.      Objective: See treatment diary below.      Assessment: Progression of TE program is tolerated well.  Left knee ROM is WNLs.      Plan: Continue treatment as per PT plan of care.       Precautions: chronic back pain      Manuals 1/3 1/8 1/10 1/13 1/15 1/20 2/3 2/7     SIJ MET             L4/L5 pa's             Thoracic pa's KL kl     KL      EPAT L knee anterior/medial joint line DI15  15 Hz  1.2 bar  3,000 pulses  JLW  nv          laser  4'  4' 4' 4' 4'  JLW 4'  JLW     L knee pa's, ap's  kl kl kl 4' patella ROM  JLW patella ROM  JLW patella ROM  JLW     Graston   Anterior/medial knee kl                                    Neuro Re-Ed             TaA             TaA bridge w/ ball squeeze  5\"x20 Single leg x20 On bosu x10 x20         TaA clamshells blue  5\"x20 ea  Sidelyinng lvl 2 3x10ea   blue  5\"x20 ea blue  5\"x20 ea blue  5\"x20 ea     TaA LPD black  3x10     black  5\"x20 bradford  13.0  3x10      rows        bradford  18.0  2x15     Scap retraction with ER  Green tb 5\"x20           B/L shldr horz abd        blue  2x10                               Ther Ex             Thoracic extension stretch supine w/ foam roll  10\"x10    10\"x10 10\"x10 10\"x10  supine w/ foam roll  10\"x10 supine w/ foam roll  10\"x10     Thread the needle  15\"x4           Open book stretch 15\"x4  15\"x4 15\"x4         Piriformis stretch fig 4  30\"x3 ea            bike 6' 8' 6' 8' 8' 8'  6'     Hip hike  5\"2x10  5# " "KB 2x10ea         Curtsy lunge  x10           Wall slides with lift off   Green tb 5\"x10 Green 5\"x20 Blue tb x20 blue  20       Monster walks    Green tb 12ftx6         TaA heel taps     3x10        Dead bud isometric     x20        Split squat with med ball rotation     Y med ball x10ea  Y med ball   15 ea   split squat with   10# kb  2x10     Cross over step up       bosu   15 ea  bosu   20 ea  bosu   20     leg press        single leg    60#   3x10 ea      lunges onto bosu         20 ea                               Ther Activity                                       Gait Training                                       Modalities                                                  "

## 2025-02-10 ENCOUNTER — OFFICE VISIT (OUTPATIENT)
Dept: PHYSICAL THERAPY | Facility: CLINIC | Age: 45
End: 2025-02-10
Payer: COMMERCIAL

## 2025-02-10 DIAGNOSIS — M54.50 LUMBAR SPINE PAIN: Primary | ICD-10-CM

## 2025-02-10 DIAGNOSIS — M25.562 LEFT KNEE PAIN, UNSPECIFIED CHRONICITY: ICD-10-CM

## 2025-02-10 PROCEDURE — 97110 THERAPEUTIC EXERCISES: CPT

## 2025-02-10 NOTE — PROGRESS NOTES
"Daily Note     Today's date: 2/10/2025  Patient name: Jackie Hdz  : 1980  MRN: [id number]  Referring provider: Pepe Oconnell DO  Dx:   Encounter Diagnosis     ICD-10-CM    1. Lumbar spine pain  M54.50       2. Left knee pain, unspecified chronicity  M25.562           Start Time: 0938  Stop Time: 1016  Total time in clinic (min): 38 minutes      Subjective: Patient reports her left knee \"twinges\" at times.      Objective: See treatment diary below.      Assessment: TE program is tolerated well.  Left knee is slightly tender during laser treatment.      Plan: Continue treatment as per PT plan of care.  Plan to discharge next session.       Precautions: chronic back pain      Manuals 1/3 1/8 1/10 1/13 1/15 1/20 2/3 2/7 2/10    SIJ MET             L4/L5 pa's             Thoracic pa's KL kl     KL                   EPAT L knee anterior/medial joint line DI15  15 Hz  1.2 bar  3,000 pulses  JLW  nv          laser  4'  4' 4' 4' 4'  JLW 4'  JLW 4'  JLW    L knee pa's, ap's  kl kl kl 4' patella ROM  JLW patella ROM  JLW patella ROM  JLW patella ROM  JLW    Graston   Anterior/medial knee kl                                    Neuro Re-Ed             TaA             TaA bridge w/ ball squeeze  5\"x20 Single leg x20 On bosu x10 x20         TaA clamshells blue  5\"x20 ea  Sidelyinng lvl 2 3x10ea   blue  5\"x20 ea blue  5\"x20 ea blue  5\"x20 ea blue  5\"x20 ea    TaA LPD black  3x10     black  5\"x20 bradford  13.0  3x10      rows        bradford  18.0  2x15     Scap retraction with ER  Green tb 5\"x20           B/L shldr horz abd        blue  2x10                               Ther Ex             Thoracic extension stretch supine w/ foam roll  10\"x10    10\"x10 10\"x10 10\"x10  supine w/ foam roll  10\"x10 supine w/ foam roll  10\"x10 supine w/ foam roll  10\"x10    Thread the needle  15\"x4           Open book stretch 15\"x4  15\"x4 15\"x4         Piriformis stretch fig 4  30\"x3 ea            bike 6' 8' 6' 8' 8' 8'  6' 8'    Hip hike  " "5\"2x10  5# KB 2x10ea         Curtsy lunge  x10       on slider  20 ea    Wall slides with lift off   Green tb 5\"x10 Green 5\"x20 Blue tb x20 blue  20       Monster walks    Green tb 12ftx6         TaA heel taps     3x10        Dead bud isometric     x20        Split squat with med ball rotation     Y med ball x10ea  Y med ball   15 ea   split squat with   10# kb  2x10     Cross over step up       bosu   15 ea  bosu   20 ea  bosu   20 bosu   20    leg press        single leg    60#   3x10 ea  single leg    60#   3x10 ea    lunges onto bosu         20 ea                               Ther Activity                                       Gait Training                                       Modalities                                              "

## 2025-02-14 ENCOUNTER — OFFICE VISIT (OUTPATIENT)
Dept: PHYSICAL THERAPY | Facility: CLINIC | Age: 45
End: 2025-02-14
Payer: COMMERCIAL

## 2025-02-14 DIAGNOSIS — M25.562 LEFT KNEE PAIN, UNSPECIFIED CHRONICITY: ICD-10-CM

## 2025-02-14 DIAGNOSIS — M54.50 LUMBAR SPINE PAIN: Primary | ICD-10-CM

## 2025-02-14 PROCEDURE — 97140 MANUAL THERAPY 1/> REGIONS: CPT

## 2025-02-14 PROCEDURE — 97110 THERAPEUTIC EXERCISES: CPT

## 2025-02-14 NOTE — PROGRESS NOTES
"Daily Note/DC Summary     Today's date: 2025  Patient name: Jackie Hdz  : 1980  MRN: [id number]  Referring provider: Pepe Oconnell DO  Dx:   Encounter Diagnosis     ICD-10-CM    1. Lumbar spine pain  M54.50       2. Left knee pain, unspecified chronicity  M25.562           Start Time: 0950  Stop Time: 1030  Total time in clinic (min): 40 minutes      Subjective: Patient reports her upper back is feeling much better - reports chronic low back pain is still sometimes bothersome.  No significant changes to report in regards to her left knee.      Objective: See treatment diary below.    Access Code: [email or web address]  URL: [email or web address]  Date: 2025  Prepared by: Lorenzo Ramos    Exercises  - Supine on Foam Roll Reach and Roll  - 1 x daily - 7 x weekly - 1 sets - 15 reps  - Thoracic Extension Mobilization on Foam Roll  - 1 x daily - 7 x weekly - 1 sets - 10 reps - 10 seconds hold  - Sidelying Thoracic Rotation with Open Book  - 1 x daily - 7 x weekly - 1 sets - 5 reps - 15 seconds hold  - Quadruped Full Range Thoracic Rotation with Reach  - 1 x daily - 7 x weekly - 1 sets - 5 reps - 15 seconds hold      Assessment: Treatment is tolerated well.  Encouraged compliance with the HEP.      Plan: Discharge from outpatient PT.       Precautions: chronic back pain      Manuals 1/3 1/8 1/10 1/13 1/15 1/20 2/3 2/7 2/10 2/14   SIJ MET             L4/L5 pa's          KL   Thoracic pa's KL kl     KL   KL                EPAT L knee anterior/medial joint line DI15  15 Hz  1.2 bar  3,000 pulses  JLW  nv          laser  4'  4' 4' 4' 4'  JLW 4'  JLW 4'  JLW 4'  JLW   L knee pa's, ap's  kl kl kl 4' patella ROM  JLW patella ROM  JLW patella ROM  JLW patella ROM  JLW patella ROM  JLW   Graston   Anterior/medial knee kl                                    Neuro Re-Ed             TaA             TaA bridge w/ ball squeeze  5\"x20 Single leg x20 On bosu x10 x20         TaA clamshells blue  5\"x20 ea  " "Sidelyinng lvl 2 3x10ea   blue  5\"x20 ea blue  5\"x20 ea blue  5\"x20 ea blue  5\"x20 ea    TaA LPD black  3x10     black  5\"x20 bradford  13.0  3x10      rows        bradford  18.0  2x15 bradford  18.0  2x15 bradford  18.0  2x15   Scap retraction with ER  Green tb 5\"x20           B/L shldr horz abd        blue  2x10                               Ther Ex             Thoracic extension stretch supine w/ foam roll  10\"x10    10\"x10 10\"x10 10\"x10  supine w/ foam roll  10\"x10 supine w/ foam roll  10\"x10 supine w/ foam roll  10\"x10 supine w/ foam roll  10\"x10   foam roller series           15 ea   Thread the needle  15\"x4        review   Open book stretch 15\"x4  15\"x4 15\"x4      review   Piriformis stretch fig 4  30\"x3 ea            bike 6' 8' 6' 8' 8' 8'  6' 8' 8'   Hip hike  5\"2x10  5# KB 2x10ea         Curtsy lunge  x10       on slider  20 ea    Wall slides with lift off   Green tb 5\"x10 Green 5\"x20 Blue tb x20 blue  20       Monster walks    Green tb 12ftx6         TaA heel taps     3x10        Dead bud isometric     x20        Split squat with med ball rotation     Y med ball x10ea  Y med ball   15 ea   split squat with   10# kb  2x10     Cross over step up       bosu   15 ea  bosu   20 ea  bosu   20 bosu   20    leg press        single leg    60#   3x10 ea  single leg    60#   3x10 ea    lunges onto bosu         20 ea                               Ther Activity                                       Gait Training                                       Modalities                                                "

## 2025-02-28 DIAGNOSIS — J11.1 FLU: ICD-10-CM

## 2025-03-01 RX ORDER — FLUTICASONE PROPIONATE 50 MCG
SPRAY, SUSPENSION (ML) NASAL
Qty: 16 ML | Refills: 0 | OUTPATIENT
Start: 2025-03-01

## 2025-05-01 DIAGNOSIS — G43.809 OTHER MIGRAINE WITHOUT STATUS MIGRAINOSUS, NOT INTRACTABLE: ICD-10-CM

## 2025-05-01 RX ORDER — ONDANSETRON 4 MG/1
TABLET, FILM COATED ORAL
Qty: 20 TABLET | Refills: 0 | Status: CANCELLED | OUTPATIENT
Start: 2025-05-01

## 2025-05-01 RX ORDER — ONDANSETRON 4 MG/1
4 TABLET, ORALLY DISINTEGRATING ORAL EVERY 8 HOURS PRN
Qty: 20 TABLET | Refills: 1 | Status: SHIPPED | OUTPATIENT
Start: 2025-05-01

## 2025-08-19 ENCOUNTER — APPOINTMENT (OUTPATIENT)
Dept: LAB | Facility: CLINIC | Age: 45
End: 2025-08-19
Payer: COMMERCIAL

## 2025-08-19 DIAGNOSIS — E61.1 IRON DEFICIENCY: ICD-10-CM

## 2025-08-19 DIAGNOSIS — E55.9 VITAMIN D DEFICIENCY: ICD-10-CM

## 2025-08-19 DIAGNOSIS — K21.9 GASTROESOPHAGEAL REFLUX DISEASE, UNSPECIFIED WHETHER ESOPHAGITIS PRESENT: ICD-10-CM

## 2025-08-19 DIAGNOSIS — Z00.00 HEALTH MAINTENANCE EXAMINATION: ICD-10-CM

## 2025-08-19 DIAGNOSIS — R73.03 PREDIABETES: ICD-10-CM

## 2025-08-19 LAB
25(OH)D3 SERPL-MCNC: 41.7 NG/ML (ref 30–100)
ALBUMIN SERPL BCG-MCNC: 4.2 G/DL (ref 3.5–5)
ALP SERPL-CCNC: 67 U/L (ref 34–104)
ALT SERPL W P-5'-P-CCNC: 14 U/L (ref 7–52)
ANION GAP SERPL CALCULATED.3IONS-SCNC: 8 MMOL/L (ref 4–13)
AST SERPL W P-5'-P-CCNC: 14 U/L (ref 13–39)
BASOPHILS # BLD AUTO: 0.03 THOUSANDS/ÂΜL (ref 0–0.1)
BASOPHILS NFR BLD AUTO: 1 % (ref 0–1)
BILIRUB SERPL-MCNC: 0.42 MG/DL (ref 0.2–1)
BUN SERPL-MCNC: 10 MG/DL (ref 5–25)
CALCIUM SERPL-MCNC: 9.4 MG/DL (ref 8.4–10.2)
CHLORIDE SERPL-SCNC: 105 MMOL/L (ref 96–108)
CHOLEST SERPL-MCNC: 176 MG/DL (ref ?–200)
CO2 SERPL-SCNC: 26 MMOL/L (ref 21–32)
CREAT SERPL-MCNC: 0.54 MG/DL (ref 0.6–1.3)
EOSINOPHIL # BLD AUTO: 0.31 THOUSAND/ÂΜL (ref 0–0.61)
EOSINOPHIL NFR BLD AUTO: 5 % (ref 0–6)
ERYTHROCYTE [DISTWIDTH] IN BLOOD BY AUTOMATED COUNT: 16.5 % (ref 11.6–15.1)
EST. AVERAGE GLUCOSE BLD GHB EST-MCNC: 123 MG/DL
FERRITIN SERPL-MCNC: 2 NG/ML (ref 30–307)
GFR SERPL CREATININE-BSD FRML MDRD: 115 ML/MIN/1.73SQ M
GLUCOSE P FAST SERPL-MCNC: 86 MG/DL (ref 65–99)
HBA1C MFR BLD: 5.9 %
HCT VFR BLD AUTO: 32.5 % (ref 34.8–46.1)
HDLC SERPL-MCNC: 54 MG/DL
HGB BLD-MCNC: 9.6 G/DL (ref 11.5–15.4)
IMM GRANULOCYTES # BLD AUTO: 0.02 THOUSAND/UL (ref 0–0.2)
IMM GRANULOCYTES NFR BLD AUTO: 0 % (ref 0–2)
LDLC SERPL CALC-MCNC: 102 MG/DL (ref 0–100)
LYMPHOCYTES # BLD AUTO: 2.43 THOUSANDS/ÂΜL (ref 0.6–4.47)
LYMPHOCYTES NFR BLD AUTO: 39 % (ref 14–44)
MAGNESIUM SERPL-MCNC: 2 MG/DL (ref 1.9–2.7)
MCH RBC QN AUTO: 23.1 PG (ref 26.8–34.3)
MCHC RBC AUTO-ENTMCNC: 29.5 G/DL (ref 31.4–37.4)
MCV RBC AUTO: 78 FL (ref 82–98)
MONOCYTES # BLD AUTO: 0.45 THOUSAND/ÂΜL (ref 0.17–1.22)
MONOCYTES NFR BLD AUTO: 7 % (ref 4–12)
NEUTROPHILS # BLD AUTO: 3.03 THOUSANDS/ÂΜL (ref 1.85–7.62)
NEUTS SEG NFR BLD AUTO: 48 % (ref 43–75)
NONHDLC SERPL-MCNC: 122 MG/DL
NRBC BLD AUTO-RTO: 0 /100 WBCS
PLATELET # BLD AUTO: 252 THOUSANDS/UL (ref 149–390)
PMV BLD AUTO: 10.9 FL (ref 8.9–12.7)
POTASSIUM SERPL-SCNC: 3.9 MMOL/L (ref 3.5–5.3)
PROT SERPL-MCNC: 6.8 G/DL (ref 6.4–8.4)
RBC # BLD AUTO: 4.16 MILLION/UL (ref 3.81–5.12)
SODIUM SERPL-SCNC: 139 MMOL/L (ref 135–147)
TRIGL SERPL-MCNC: 99 MG/DL (ref ?–150)
TSH SERPL DL<=0.05 MIU/L-ACNC: 1.11 UIU/ML (ref 0.45–4.5)
VIT B12 SERPL-MCNC: 264 PG/ML (ref 180–914)
WBC # BLD AUTO: 6.27 THOUSAND/UL (ref 4.31–10.16)

## 2025-08-19 PROCEDURE — 84443 ASSAY THYROID STIM HORMONE: CPT

## 2025-08-19 PROCEDURE — 36415 COLL VENOUS BLD VENIPUNCTURE: CPT

## 2025-08-19 PROCEDURE — 83036 HEMOGLOBIN GLYCOSYLATED A1C: CPT

## 2025-08-19 PROCEDURE — 85025 COMPLETE CBC W/AUTO DIFF WBC: CPT

## 2025-08-19 PROCEDURE — 80061 LIPID PANEL: CPT

## 2025-08-19 PROCEDURE — 82728 ASSAY OF FERRITIN: CPT

## 2025-08-19 PROCEDURE — 82607 VITAMIN B-12: CPT

## 2025-08-19 PROCEDURE — 80053 COMPREHEN METABOLIC PANEL: CPT

## 2025-08-19 PROCEDURE — 82306 VITAMIN D 25 HYDROXY: CPT

## 2025-08-19 PROCEDURE — 83735 ASSAY OF MAGNESIUM: CPT

## (undated) DEVICE — PVC URETHRAL CATHETER: Brand: DOVER

## (undated) DEVICE — ENDOMETRIAL BX PIPELLE

## (undated) DEVICE — STRL ALLENTOWN HYSTEROSCOPY PK: Brand: CARDINAL HEALTH

## (undated) DEVICE — GLOVE SRG BIOGEL 6.5

## (undated) DEVICE — 3000CC GUARDIAN II: Brand: GUARDIAN

## (undated) DEVICE — LIGHT HANDLE COVER SLEEVE DISP BLUE STELLAR

## (undated) DEVICE — UNDER BUTTOCKS DRAPE W/FLUID CONTROL POUCH: Brand: CONVERTORS

## (undated) DEVICE — GLOVE INDICATOR PI UNDERGLOVE SZ 7 BLUE

## (undated) DEVICE — DEVICE MYOSURE LITE TISSUE REMOVAL HYSTEROSCOPIC

## (undated) DEVICE — PREMIUM DRY TRAY LF: Brand: MEDLINE INDUSTRIES, INC.

## (undated) DEVICE — STERILE SURGICAL LUBRICANT,  TUBE: Brand: SURGILUBE

## (undated) DEVICE — SPONGE LAP 18 X 18 IN

## (undated) DEVICE — CHLORHEXIDINE 4PCT 4 OZ